# Patient Record
Sex: MALE | Race: WHITE | Employment: FULL TIME | URBAN - METROPOLITAN AREA
[De-identification: names, ages, dates, MRNs, and addresses within clinical notes are randomized per-mention and may not be internally consistent; named-entity substitution may affect disease eponyms.]

---

## 2022-09-24 ENCOUNTER — APPOINTMENT (OUTPATIENT)
Dept: GENERAL RADIOLOGY | Age: 52
DRG: 637 | End: 2022-09-24
Payer: COMMERCIAL

## 2022-09-24 ENCOUNTER — HOSPITAL ENCOUNTER (INPATIENT)
Age: 52
LOS: 1 days | Discharge: HOME | DRG: 637 | End: 2022-09-28
Attending: EMERGENCY MEDICINE | Admitting: INTERNAL MEDICINE
Payer: COMMERCIAL

## 2022-09-24 DIAGNOSIS — E11.9 NEW ONSET TYPE 2 DIABETES MELLITUS (HCC): Primary | ICD-10-CM

## 2022-09-24 PROBLEM — R73.9 ACUTE HYPERGLYCEMIA: Status: ACTIVE | Noted: 2022-09-24

## 2022-09-24 LAB
A/G RATIO: 1.8 (ref 1.1–2.2)
ALBUMIN SERPL-MCNC: 5.3 G/DL (ref 3.4–5)
ALP BLD-CCNC: 205 U/L (ref 40–129)
ALT SERPL-CCNC: 24 U/L (ref 10–40)
ANION GAP SERPL CALCULATED.3IONS-SCNC: 14 MMOL/L (ref 3–16)
AST SERPL-CCNC: 15 U/L (ref 15–37)
BASE EXCESS VENOUS: 1.7 MMOL/L (ref -3–3)
BASOPHILS ABSOLUTE: 0.1 K/UL (ref 0–0.2)
BASOPHILS RELATIVE PERCENT: 0.8 %
BILIRUB SERPL-MCNC: 0.4 MG/DL (ref 0–1)
BILIRUBIN URINE: NEGATIVE
BLOOD, URINE: NEGATIVE
BUN BLDV-MCNC: 12 MG/DL (ref 7–20)
CALCIUM SERPL-MCNC: 10.5 MG/DL (ref 8.3–10.6)
CARBOXYHEMOGLOBIN: 3 % (ref 0–1.5)
CHLORIDE BLD-SCNC: 91 MMOL/L (ref 99–110)
CLARITY: CLEAR
CO2: 24 MMOL/L (ref 21–32)
COLOR: YELLOW
CREAT SERPL-MCNC: 1.1 MG/DL (ref 0.9–1.3)
EOSINOPHILS ABSOLUTE: 0.1 K/UL (ref 0–0.6)
EOSINOPHILS RELATIVE PERCENT: 1.3 %
ETHANOL: NORMAL MG/DL (ref 0–0.08)
GFR AFRICAN AMERICAN: >60
GFR NON-AFRICAN AMERICAN: >60
GLUCOSE BLD-MCNC: 213 MG/DL (ref 70–99)
GLUCOSE BLD-MCNC: 252 MG/DL (ref 70–99)
GLUCOSE BLD-MCNC: 388 MG/DL
GLUCOSE BLD-MCNC: 388 MG/DL (ref 70–99)
GLUCOSE BLD-MCNC: 523 MG/DL (ref 70–99)
GLUCOSE BLD-MCNC: 574 MG/DL (ref 70–99)
GLUCOSE URINE: >=1000 MG/DL
HCO3 VENOUS: 25.7 MMOL/L (ref 23–29)
HCT VFR BLD CALC: 47.6 % (ref 40.5–52.5)
HEMOGLOBIN: 16 G/DL (ref 13.5–17.5)
KETONES, URINE: NEGATIVE MG/DL
LEUKOCYTE ESTERASE, URINE: NEGATIVE
LIPASE: 68 U/L (ref 13–60)
LYMPHOCYTES ABSOLUTE: 1.3 K/UL (ref 1–5.1)
LYMPHOCYTES RELATIVE PERCENT: 18.9 %
MAGNESIUM: 2.2 MG/DL (ref 1.8–2.4)
MCH RBC QN AUTO: 30.5 PG (ref 26–34)
MCHC RBC AUTO-ENTMCNC: 33.5 G/DL (ref 31–36)
MCV RBC AUTO: 91 FL (ref 80–100)
METHEMOGLOBIN VENOUS: 0.3 %
MICROSCOPIC EXAMINATION: ABNORMAL
MONOCYTES ABSOLUTE: 0.4 K/UL (ref 0–1.3)
MONOCYTES RELATIVE PERCENT: 5.9 %
NEUTROPHILS ABSOLUTE: 5.1 K/UL (ref 1.7–7.7)
NEUTROPHILS RELATIVE PERCENT: 73.1 %
NITRITE, URINE: NEGATIVE
O2 SAT, VEN: 78 %
O2 THERAPY: ABNORMAL
PCO2, VEN: 38.3 MMHG (ref 40–50)
PDW BLD-RTO: 12.7 % (ref 12.4–15.4)
PERFORMED ON: ABNORMAL
PH UA: 6 (ref 5–8)
PH VENOUS: 7.44 (ref 7.35–7.45)
PLATELET # BLD: 339 K/UL (ref 135–450)
PMV BLD AUTO: 8.1 FL (ref 5–10.5)
PO2, VEN: 38.5 MMHG (ref 25–40)
POTASSIUM SERPL-SCNC: 4.2 MMOL/L (ref 3.5–5.1)
PROTEIN UA: NEGATIVE MG/DL
RBC # BLD: 5.23 M/UL (ref 4.2–5.9)
SODIUM BLD-SCNC: 129 MMOL/L (ref 136–145)
SPECIFIC GRAVITY UA: <=1.005 (ref 1–1.03)
SPECIMEN STATUS: NORMAL
TCO2 CALC VENOUS: 27 MMOL/L
TOTAL PROTEIN: 8.2 G/DL (ref 6.4–8.2)
URINE REFLEX TO CULTURE: ABNORMAL
URINE TYPE: ABNORMAL
UROBILINOGEN, URINE: 0.2 E.U./DL
WBC # BLD: 7 K/UL (ref 4–11)

## 2022-09-24 PROCEDURE — 96360 HYDRATION IV INFUSION INIT: CPT

## 2022-09-24 PROCEDURE — 2580000003 HC RX 258: Performed by: EMERGENCY MEDICINE

## 2022-09-24 PROCEDURE — G0378 HOSPITAL OBSERVATION PER HR: HCPCS

## 2022-09-24 PROCEDURE — 80053 COMPREHEN METABOLIC PANEL: CPT

## 2022-09-24 PROCEDURE — 99285 EMERGENCY DEPT VISIT HI MDM: CPT

## 2022-09-24 PROCEDURE — 81003 URINALYSIS AUTO W/O SCOPE: CPT

## 2022-09-24 PROCEDURE — 85025 COMPLETE CBC W/AUTO DIFF WBC: CPT

## 2022-09-24 PROCEDURE — 6370000000 HC RX 637 (ALT 250 FOR IP): Performed by: HOSPITALIST

## 2022-09-24 PROCEDURE — 83735 ASSAY OF MAGNESIUM: CPT

## 2022-09-24 PROCEDURE — 82803 BLOOD GASES ANY COMBINATION: CPT

## 2022-09-24 PROCEDURE — 6370000000 HC RX 637 (ALT 250 FOR IP): Performed by: EMERGENCY MEDICINE

## 2022-09-24 PROCEDURE — 96374 THER/PROPH/DIAG INJ IV PUSH: CPT

## 2022-09-24 PROCEDURE — 2580000003 HC RX 258: Performed by: HOSPITALIST

## 2022-09-24 PROCEDURE — 96361 HYDRATE IV INFUSION ADD-ON: CPT

## 2022-09-24 PROCEDURE — 71045 X-RAY EXAM CHEST 1 VIEW: CPT

## 2022-09-24 PROCEDURE — 82077 ASSAY SPEC XCP UR&BREATH IA: CPT

## 2022-09-24 PROCEDURE — 93005 ELECTROCARDIOGRAM TRACING: CPT | Performed by: EMERGENCY MEDICINE

## 2022-09-24 PROCEDURE — 83690 ASSAY OF LIPASE: CPT

## 2022-09-24 RX ORDER — ENOXAPARIN SODIUM 100 MG/ML
40 INJECTION SUBCUTANEOUS DAILY
Status: DISCONTINUED | OUTPATIENT
Start: 2022-09-25 | End: 2022-09-28 | Stop reason: HOSPADM

## 2022-09-24 RX ORDER — POTASSIUM CHLORIDE 20 MEQ/1
40 TABLET, EXTENDED RELEASE ORAL PRN
Status: DISCONTINUED | OUTPATIENT
Start: 2022-09-24 | End: 2022-09-28 | Stop reason: HOSPADM

## 2022-09-24 RX ORDER — INSULIN LISPRO 100 [IU]/ML
0-8 INJECTION, SOLUTION INTRAVENOUS; SUBCUTANEOUS
Status: DISCONTINUED | OUTPATIENT
Start: 2022-09-24 | End: 2022-09-28 | Stop reason: HOSPADM

## 2022-09-24 RX ORDER — INSULIN GLARGINE 100 [IU]/ML
15 INJECTION, SOLUTION SUBCUTANEOUS NIGHTLY
Status: DISCONTINUED | OUTPATIENT
Start: 2022-09-24 | End: 2022-09-28 | Stop reason: HOSPADM

## 2022-09-24 RX ORDER — ACETAMINOPHEN 650 MG/1
650 SUPPOSITORY RECTAL EVERY 6 HOURS PRN
Status: DISCONTINUED | OUTPATIENT
Start: 2022-09-24 | End: 2022-09-28 | Stop reason: HOSPADM

## 2022-09-24 RX ORDER — DEXTROSE MONOHYDRATE 100 MG/ML
INJECTION, SOLUTION INTRAVENOUS CONTINUOUS PRN
Status: DISCONTINUED | OUTPATIENT
Start: 2022-09-24 | End: 2022-09-28 | Stop reason: HOSPADM

## 2022-09-24 RX ORDER — SODIUM CHLORIDE 9 MG/ML
INJECTION, SOLUTION INTRAVENOUS PRN
Status: DISCONTINUED | OUTPATIENT
Start: 2022-09-24 | End: 2022-09-28 | Stop reason: HOSPADM

## 2022-09-24 RX ORDER — SODIUM CHLORIDE 9 MG/ML
INJECTION, SOLUTION INTRAVENOUS CONTINUOUS
Status: ACTIVE | OUTPATIENT
Start: 2022-09-24 | End: 2022-09-25

## 2022-09-24 RX ORDER — 0.9 % SODIUM CHLORIDE 0.9 %
1000 INTRAVENOUS SOLUTION INTRAVENOUS ONCE
Status: COMPLETED | OUTPATIENT
Start: 2022-09-24 | End: 2022-09-24

## 2022-09-24 RX ORDER — ACETAMINOPHEN 325 MG/1
650 TABLET ORAL EVERY 6 HOURS PRN
Status: DISCONTINUED | OUTPATIENT
Start: 2022-09-24 | End: 2022-09-28 | Stop reason: HOSPADM

## 2022-09-24 RX ORDER — ASPIRIN 81 MG/1
81 TABLET ORAL DAILY
Status: DISCONTINUED | OUTPATIENT
Start: 2022-09-24 | End: 2022-09-28 | Stop reason: HOSPADM

## 2022-09-24 RX ORDER — SENNA PLUS 8.6 MG/1
1 TABLET ORAL DAILY PRN
Status: DISCONTINUED | OUTPATIENT
Start: 2022-09-24 | End: 2022-09-28 | Stop reason: HOSPADM

## 2022-09-24 RX ORDER — SODIUM CHLORIDE 0.9 % (FLUSH) 0.9 %
10 SYRINGE (ML) INJECTION PRN
Status: DISCONTINUED | OUTPATIENT
Start: 2022-09-24 | End: 2022-09-28 | Stop reason: HOSPADM

## 2022-09-24 RX ORDER — MAGNESIUM SULFATE IN WATER 40 MG/ML
2000 INJECTION, SOLUTION INTRAVENOUS PRN
Status: DISCONTINUED | OUTPATIENT
Start: 2022-09-24 | End: 2022-09-28 | Stop reason: HOSPADM

## 2022-09-24 RX ORDER — PROMETHAZINE HYDROCHLORIDE 25 MG/1
12.5 TABLET ORAL EVERY 6 HOURS PRN
Status: DISCONTINUED | OUTPATIENT
Start: 2022-09-24 | End: 2022-09-28 | Stop reason: HOSPADM

## 2022-09-24 RX ORDER — LABETALOL HYDROCHLORIDE 5 MG/ML
5 INJECTION, SOLUTION INTRAVENOUS EVERY 4 HOURS PRN
Status: DISCONTINUED | OUTPATIENT
Start: 2022-09-24 | End: 2022-09-28 | Stop reason: HOSPADM

## 2022-09-24 RX ORDER — INSULIN LISPRO 100 [IU]/ML
0.05 INJECTION, SOLUTION INTRAVENOUS; SUBCUTANEOUS
Status: DISCONTINUED | OUTPATIENT
Start: 2022-09-24 | End: 2022-09-28 | Stop reason: HOSPADM

## 2022-09-24 RX ORDER — SODIUM CHLORIDE 0.9 % (FLUSH) 0.9 %
10 SYRINGE (ML) INJECTION EVERY 12 HOURS SCHEDULED
Status: DISCONTINUED | OUTPATIENT
Start: 2022-09-24 | End: 2022-09-28 | Stop reason: HOSPADM

## 2022-09-24 RX ORDER — INSULIN LISPRO 100 [IU]/ML
0-4 INJECTION, SOLUTION INTRAVENOUS; SUBCUTANEOUS NIGHTLY
Status: DISCONTINUED | OUTPATIENT
Start: 2022-09-24 | End: 2022-09-28 | Stop reason: HOSPADM

## 2022-09-24 RX ORDER — ONDANSETRON 2 MG/ML
4 INJECTION INTRAMUSCULAR; INTRAVENOUS EVERY 6 HOURS PRN
Status: DISCONTINUED | OUTPATIENT
Start: 2022-09-24 | End: 2022-09-28 | Stop reason: HOSPADM

## 2022-09-24 RX ORDER — POTASSIUM CHLORIDE 7.45 MG/ML
10 INJECTION INTRAVENOUS PRN
Status: DISCONTINUED | OUTPATIENT
Start: 2022-09-24 | End: 2022-09-28 | Stop reason: HOSPADM

## 2022-09-24 RX ORDER — LISINOPRIL 10 MG/1
5 TABLET ORAL DAILY
Status: DISCONTINUED | OUTPATIENT
Start: 2022-09-24 | End: 2022-09-28 | Stop reason: HOSPADM

## 2022-09-24 RX ADMIN — SODIUM CHLORIDE 1000 ML: 9 INJECTION, SOLUTION INTRAVENOUS at 16:03

## 2022-09-24 RX ADMIN — SODIUM CHLORIDE, PRESERVATIVE FREE 10 ML: 5 INJECTION INTRAVENOUS at 22:01

## 2022-09-24 RX ADMIN — INSULIN GLARGINE 15 UNITS: 100 INJECTION, SOLUTION SUBCUTANEOUS at 21:58

## 2022-09-24 RX ADMIN — INSULIN HUMAN 4 UNITS: 100 INJECTION, SOLUTION PARENTERAL at 18:18

## 2022-09-24 RX ADMIN — SODIUM CHLORIDE 1000 ML: 9 INJECTION, SOLUTION INTRAVENOUS at 16:24

## 2022-09-24 ASSESSMENT — PAIN - FUNCTIONAL ASSESSMENT: PAIN_FUNCTIONAL_ASSESSMENT: NONE - DENIES PAIN

## 2022-09-24 NOTE — LETTER
[address]  [address] [facility] [address]  Phone: [phone]             September 28, 2022    Patient: Melia Rojas   YOB: 1970   Date of Visit: 9/24/2022       To Whom It May Concern:    Mary Zuñiga was seen and treated in our facility  beginning 9/24/2022 until 9/28/22. He may return to work on 9/30.      Sincerely,       Tayler Finney RN         Signature:__________________________________

## 2022-09-24 NOTE — H&P
HOSPITALISTS HISTORY AND PHYSICAL    9/25/2022 5:41 AM    Patient Information:  Kain Grimes is a 46 y.o. male [id number]  PCP:  Megan Sheth MD (Tel: [phone] )    Chief complaint:    Chief Complaint   Patient presents with    Hyperglycemia     Pt to ED with c/o hyperglycemia, glucose in 400s at home. Pt not diagnosed diabetic but used dads meter and sugar read in 400s. Pt reports increased urination, feeling thirsty, generalized weakness pt reports for years. History of Present Illness:  Yoko Mcnamara is a 46 y.o. male who presented to the ED to be evaluated for concerns of new onset diabetes. He reports that for the past several years he has experienced polyuria, polydipsia, polyphagia, and generalized weakness. His father, who is diabetic, allowed him to use his own glucometer; results revealed multiple glucose readings greater than 400. Upon further questioning patient also endorses blurred vision and symptoms of BLE neuropathy. He reports that his only known medical diagnoses prior to today include WPW and GERD. Upon arrival to the ED EKG was obtained revealing NSR with fusion complexes and PACs; new LBBB identified. CXR negative for acute cardiopulmonary findings. Notable labs include: Pseudohyponatremia 129, hyperglycemia 574, and lipase 68. Patient received NS bolus per ED attending as well as 4 units of IV Humulin prior to request for admission. History obtained from patient and review of [facility] chart    Old medical records show echo performed several years ago in 2010 was notable for the following abnormalities:  INTERPRETATION-  Concentric left ventricular hypertrophy with normal   left ventricular contractility and estimated ejection fraction of   55%. Right ventricular enlargement with normal contractility. Normal sized right atrium and aortic root.   Left atrium is slightly   enlarged. Normal mitral, tricuspid, aortic, and pulmonic valves. No intracardiac mass, vegetations, or thrombi. Intraatrial septum   is intact. No pericardial effusion. Inferior vena cava is not   visualized. By Doppler examination there is no valvular stenosis. A trace   mitral and a trace tricuspid regurgitation are present. There is   diastolic dysfunction of left ventricle. CONCLUSION-  Echocardiogram with a Doppler shows left ventricular   hypertrophy with normal ejection fraction. There is diastolic   dysfunction of left ventricle. No valvular heart disease is seen. There is no pericardial effusion. REVIEW OF SYSTEMS:   Constitutional: Negative for fever,chills; positive generalized weakness  ENT: Negative for headache, rhinorrhea, and sore throat. Respiratory: Negative for shortness of breath, wheezing, and cough  Cardiovascular: Negative for chest pain, palpitations, peripheral edema, orthopnea or PND  Gastrointestinal: Negative for N/V/D and abdominal pain; no hematemesis, hematochezia, or melena; no anorexia  Genitourinary: Positive for dysuria, frequency; denies retention; no incontinence  Hematologic/Lymphatic: Negative for bleeding tendency/excessive bruising  Musculoskeletal: Negative for myalgias and arthalgias; able to ambulate without difficulty  Neurologic: Positive BLE paresthesias; negative for LOC, seizure activity, dysarthria, vertigo, and gait disturbance  Skin: Negative for itching,rash, decubitus  Psychiatric: Negative for depression,anxiety, and agitation; no hallucinations; denies SI/HI  Endocrine: Positive for polyuria/polydipsia/polyphagia; strong family history of type II DM    Past Medical History:   has a past medical history of GERD (gastroesophageal reflux disease) and WPW (Dorothy-Parkinson-White syndrome). Past Surgical History:   has a past surgical history that includes hernia repair.      Medications:  No current facility-administered medications on file prior to encounter. Current Outpatient Medications on File Prior to Encounter   Medication Sig Dispense Refill    Cholecalciferol (VITAMIN D3) 5000 UNITS TABS Take by mouth (Patient not taking: Reported on 9/24/2022)      ibuprofen (ADVIL;MOTRIN) 800 MG tablet Take 1 tablet by mouth every 8 hours as needed for Pain or Fever 20 tablet 0    lidocaine (LIDODERM) 5 % Place 1 patch onto the skin daily 12 hours on, 12 hours off. 30 patch 0    Ascorbic Acid (VITAMIN C) 500 MG tablet Take 500 mg by mouth daily. (Patient not taking: Reported on 9/24/2022)      vitamin E 1000 UNIT capsule Take 1,000 Units by mouth daily. (Patient not taking: Reported on 9/24/2022)      [address] by Does not apply route. (Patient not taking: Reported on 9/24/2022)         Allergies: Allergies   Allergen Reactions    Penicillins Hives        Social History:   reports that he has never smoked. He has never used smokeless tobacco. He reports that he does not drink alcohol and does not use drugs.      Family History:  Type II DM in father    Physical Exam:  BP (!) 147/74   Pulse 57   Temp 98.1 °F (36.7 °C) (Oral)   Resp 18   Ht 6' 1\" (1.854 m)   Wt 157 lb (71.2 kg)   SpO2 100%   BMI 20.71 kg/m²     General appearance: WDWN male resting comfortably in bed, NAD  Eyes: Sclera clear without conjunctival injection; PERRLA; EOMI  ENT: Mucous membranes moist without thrush; normal dentition  Neck: Supple without meningismus; no goiter; no carotid bruit bilaterally  Cardiovascular: Regular rhythm with occasional ectopy; normal S1-S2 with no murmurs; no peripheral edema; no JVD  Respiratory: No tachypnea; CTAB with adequate air exchange, no wheeze, rhonchi or rales; I:E intact  Gastrointestinal: Abdomen soft, non-tender, not distended; bowel sounds normal; no masses/organomegaly appreciated  Musculoskeletal: FROM spine and extremities x4; no gross deformity  Neurology: A&O x3; cranial nerves 2-12 grossly intact; motor 5/5  BUE/BLE; no seizure activity  Psychiatry: Well-groomed with good eye contact; anxious affect; no visual/auditory hallucination  Skin: BLE pretibial regions with scarring and excoriations  PV: 2/4 radial and dorsalis pedis bilaterally; brisk capillary refill    Labs:  CBC:   Lab Results   Component Value Date/Time    WBC 7.1 09/25/2022 04:24 AM    RBC 4.40 09/25/2022 04:24 AM    HGB 13.6 09/25/2022 04:24 AM    HCT 39.9 09/25/2022 04:24 AM    MCV 90.5 09/25/2022 04:24 AM    MCH 30.9 09/25/2022 04:24 AM    MCHC 34.2 09/25/2022 04:24 AM    RDW 12.8 09/25/2022 04:24 AM     09/25/2022 04:24 AM    MPV 7.6 09/25/2022 04:24 AM     BMP:    Lab Results   Component Value Date/Time     09/25/2022 04:24 AM    K 3.4 09/25/2022 04:24 AM     09/25/2022 04:24 AM    CO2 25 09/25/2022 04:24 AM    BUN 11 09/25/2022 04:24 AM    CREATININE 0.7 09/25/2022 04:24 AM    CALCIUM 9.0 09/25/2022 04:24 AM    GFRAA >60 09/25/2022 04:24 AM    GFRAA >60 04/26/2011 07:00 PM    LABGLOM >60 09/25/2022 04:24 AM    GLUCOSE 292 09/25/2022 04:24 AM     XR CHEST PORTABLE   Final Result   Stable chronic changes with no acute abnormality seen. EKG: Ventricular Rate 87 P BPM QTc Calculation (Bazett) 486 P ms   Atrial Rate 87 P BPM P Axis 40 P degrees   P-R Interval 188 P ms R Axis -1 P degrees   QRS Duration 146 P ms T Axis 74 P degrees   Q-T Interval 404 P ms Diagnosis Sinus rhythm with Fusion complexes and Premature atrial complexes with Aberrant conduction; new LBBB        I visualized CXR images and EKG strips personally and agree with documented interpretation    Discussed case  with ED provider    Problem List:  Principal Problem:    New onset type 2 diabetes mellitus ([facility] [address]  Active Problems:    Acute hyperglycemia    New onset left bundle branch block (LBBB)    Elevated BP without diagnosis of hypertension    Peripheral neuropathy  Resolved Problems:    * No resolved hospital problems.  *        Consults:  IP CONSULT TO HOSPITALIST  IP CONSULT TO CARDIOLOGY  IP CONSULT TO DIABETES EDUCATOR  IP CONSULT TO DIETITIAN      Assessment/Plan:     Newly diagnosed type II DM with acute hyperglycemia  -A1c ordered with results pending at time of dictation  -Patient on no hypoglycemic agents PTA  - Initiate weight-based Lantus insulin 15 units nightly  -Humalog scheduled AC/HS in addition to PRN SSI coverage  -Carbohydrate restriction placed on diet  -Consultation placed to Nutrition for ADA dietary education     BP elevation with new LBBB  -Patient admitted to telemetry floor for continuous monitoring during stay  -EKG obtained in ED reviewed personally and notable for new LBBB  -Recommend initiation of oral lisinopril in the setting of newly diagnosed DM  -IV labetalol scheduled PRN (with parameters) to address extreme BP elevation  -Serial cardiac enzymes will be trended overnight; BNP pending  -ECHO scheduled in a.m. to further assess cardiac structure and function       DVT prophylaxis-Lovenox 40 mg subcu daily  Code status-full code  Diet-cardiac with carb restriction  IV access-PIV established in ED      Admit as observation. I anticipate hospitalization spanning less than two midnights for investigation and treatment of the above medically necessary diagnoses. Comment: Please note this report has been produced using speech recognition software and may contain errors related to that system including errors in grammar, punctuation, and spelling, as well as words and phrases that may be inappropriate. If there are any questions or concerns please feel free to contact the dictating provider for clarification.          Camelia Wells MD    9/25/2022 5:41 AM

## 2022-09-24 NOTE — LETTER
[address] [address] [address]  Phone: [phone]             September 28, 2022    Patient: Jo Sadler   YOB: 1970   Date of Visit: 9/24/2022       To Whom It May Concern:    Carolann Goodwin was seen and treated in our facility  beginning 9/24/2022 until {DISCHARGE ORES:[id number]}. He may return to work on 9/30 with a 5 pound lifting restriction until October, 4th .       Sincerely,       Geraldine Lama RN         Signature:__________________________________

## 2022-09-25 PROBLEM — E87.1 HYPONATREMIA: Status: ACTIVE | Noted: 2022-09-25

## 2022-09-25 PROBLEM — I44.7 NEW ONSET LEFT BUNDLE BRANCH BLOCK (LBBB): Status: ACTIVE | Noted: 2022-09-25

## 2022-09-25 PROBLEM — G62.9 PERIPHERAL NEUROPATHY: Status: ACTIVE | Noted: 2022-09-25

## 2022-09-25 PROBLEM — I10 BENIGN ESSENTIAL HTN: Status: ACTIVE | Noted: 2022-09-25

## 2022-09-25 PROBLEM — R03.0 ELEVATED BP WITHOUT DIAGNOSIS OF HYPERTENSION: Status: ACTIVE | Noted: 2022-09-25

## 2022-09-25 PROBLEM — E87.6 HYPOKALEMIA: Status: ACTIVE | Noted: 2022-09-25

## 2022-09-25 LAB
A/G RATIO: 1.5 (ref 1.1–2.2)
ALBUMIN SERPL-MCNC: 3.8 G/DL (ref 3.4–5)
ALBUMIN SERPL-MCNC: 4.3 G/DL (ref 3.4–5)
ALP BLD-CCNC: 135 U/L (ref 40–129)
ALT SERPL-CCNC: 17 U/L (ref 10–40)
AMPHETAMINE SCREEN, URINE: NORMAL
ANION GAP SERPL CALCULATED.3IONS-SCNC: 8 MMOL/L (ref 3–16)
ANION GAP SERPL CALCULATED.3IONS-SCNC: 9 MMOL/L (ref 3–16)
AST SERPL-CCNC: 14 U/L (ref 15–37)
BARBITURATE SCREEN URINE: NORMAL
BASOPHILS ABSOLUTE: 0.1 K/UL (ref 0–0.2)
BASOPHILS RELATIVE PERCENT: 1.1 %
BENZODIAZEPINE SCREEN, URINE: NORMAL
BILIRUB SERPL-MCNC: <0.2 MG/DL (ref 0–1)
BUN BLDV-MCNC: 10 MG/DL (ref 7–20)
BUN BLDV-MCNC: 11 MG/DL (ref 7–20)
CALCIUM SERPL-MCNC: 9 MG/DL (ref 8.3–10.6)
CALCIUM SERPL-MCNC: 9.1 MG/DL (ref 8.3–10.6)
CANNABINOID SCREEN URINE: NORMAL
CHLORIDE BLD-SCNC: 101 MMOL/L (ref 99–110)
CHLORIDE BLD-SCNC: 101 MMOL/L (ref 99–110)
CO2: 24 MMOL/L (ref 21–32)
CO2: 25 MMOL/L (ref 21–32)
COCAINE METABOLITE SCREEN URINE: NORMAL
CREAT SERPL-MCNC: 0.7 MG/DL (ref 0.9–1.3)
CREAT SERPL-MCNC: 0.7 MG/DL (ref 0.9–1.3)
EKG ATRIAL RATE: 75 BPM
EKG ATRIAL RATE: 87 BPM
EKG DIAGNOSIS: NORMAL
EKG DIAGNOSIS: NORMAL
EKG P AXIS: 40 DEGREES
EKG P AXIS: 64 DEGREES
EKG P-R INTERVAL: 188 MS
EKG P-R INTERVAL: 196 MS
EKG Q-T INTERVAL: 404 MS
EKG Q-T INTERVAL: 464 MS
EKG QRS DURATION: 146 MS
EKG QRS DURATION: 146 MS
EKG QTC CALCULATION (BAZETT): 486 MS
EKG QTC CALCULATION (BAZETT): 518 MS
EKG R AXIS: -1 DEGREES
EKG R AXIS: 36 DEGREES
EKG T AXIS: -40 DEGREES
EKG T AXIS: 74 DEGREES
EKG VENTRICULAR RATE: 75 BPM
EKG VENTRICULAR RATE: 87 BPM
EOSINOPHILS ABSOLUTE: 0.3 K/UL (ref 0–0.6)
EOSINOPHILS RELATIVE PERCENT: 3.8 %
ESTIMATED AVERAGE GLUCOSE: 343.6 MG/DL
FENTANYL SCREEN, URINE: NORMAL
GFR AFRICAN AMERICAN: >60
GFR AFRICAN AMERICAN: >60
GFR NON-AFRICAN AMERICAN: >60
GFR NON-AFRICAN AMERICAN: >60
GLUCOSE BLD-MCNC: 113 MG/DL (ref 70–99)
GLUCOSE BLD-MCNC: 188 MG/DL (ref 70–99)
GLUCOSE BLD-MCNC: 208 MG/DL (ref 70–99)
GLUCOSE BLD-MCNC: 221 MG/DL (ref 70–99)
GLUCOSE BLD-MCNC: 223 MG/DL (ref 70–99)
GLUCOSE BLD-MCNC: 257 MG/DL (ref 70–99)
GLUCOSE BLD-MCNC: 292 MG/DL (ref 70–99)
HBA1C MFR BLD: 13.6 %
HCT VFR BLD CALC: 39.9 % (ref 40.5–52.5)
HEMOGLOBIN: 13.6 G/DL (ref 13.5–17.5)
LACTIC ACID, SEPSIS: 0.8 MMOL/L (ref 0.4–1.9)
LACTIC ACID, SEPSIS: 0.9 MMOL/L (ref 0.4–1.9)
LYMPHOCYTES ABSOLUTE: 2.5 K/UL (ref 1–5.1)
LYMPHOCYTES RELATIVE PERCENT: 35.4 %
Lab: NORMAL
MAGNESIUM: 2 MG/DL (ref 1.8–2.4)
MCH RBC QN AUTO: 30.9 PG (ref 26–34)
MCHC RBC AUTO-ENTMCNC: 34.2 G/DL (ref 31–36)
MCV RBC AUTO: 90.5 FL (ref 80–100)
METHADONE SCREEN, URINE: NORMAL
MONOCYTES ABSOLUTE: 0.4 K/UL (ref 0–1.3)
MONOCYTES RELATIVE PERCENT: 6 %
NEUTROPHILS ABSOLUTE: 3.8 K/UL (ref 1.7–7.7)
NEUTROPHILS RELATIVE PERCENT: 53.7 %
OPIATE SCREEN URINE: NORMAL
OXYCODONE URINE: NORMAL
PDW BLD-RTO: 12.8 % (ref 12.4–15.4)
PERFORMED ON: ABNORMAL
PH UA: 7
PHENCYCLIDINE SCREEN URINE: NORMAL
PHOSPHORUS: 1.9 MG/DL (ref 2.5–4.9)
PHOSPHORUS: 2.4 MG/DL (ref 2.5–4.9)
PLATELET # BLD: 264 K/UL (ref 135–450)
PMV BLD AUTO: 7.6 FL (ref 5–10.5)
POTASSIUM REFLEX MAGNESIUM: 3.4 MMOL/L (ref 3.5–5.1)
POTASSIUM SERPL-SCNC: 3.7 MMOL/L (ref 3.5–5.1)
PROCALCITONIN: 0.09 NG/ML (ref 0–0.15)
RBC # BLD: 4.4 M/UL (ref 4.2–5.9)
SODIUM BLD-SCNC: 134 MMOL/L (ref 136–145)
SODIUM BLD-SCNC: 134 MMOL/L (ref 136–145)
TOTAL PROTEIN: 6.3 G/DL (ref 6.4–8.2)
WBC # BLD: 7.1 K/UL (ref 4–11)

## 2022-09-25 PROCEDURE — 85025 COMPLETE CBC W/AUTO DIFF WBC: CPT

## 2022-09-25 PROCEDURE — 96372 THER/PROPH/DIAG INJ SC/IM: CPT

## 2022-09-25 PROCEDURE — 83605 ASSAY OF LACTIC ACID: CPT

## 2022-09-25 PROCEDURE — 80307 DRUG TEST PRSMV CHEM ANLYZR: CPT

## 2022-09-25 PROCEDURE — G0378 HOSPITAL OBSERVATION PER HR: HCPCS

## 2022-09-25 PROCEDURE — 93005 ELECTROCARDIOGRAM TRACING: CPT | Performed by: HOSPITALIST

## 2022-09-25 PROCEDURE — 83036 HEMOGLOBIN GLYCOSYLATED A1C: CPT

## 2022-09-25 PROCEDURE — 84145 PROCALCITONIN (PCT): CPT

## 2022-09-25 PROCEDURE — 93010 ELECTROCARDIOGRAM REPORT: CPT | Performed by: INTERNAL MEDICINE

## 2022-09-25 PROCEDURE — 80053 COMPREHEN METABOLIC PANEL: CPT

## 2022-09-25 PROCEDURE — 84100 ASSAY OF PHOSPHORUS: CPT

## 2022-09-25 PROCEDURE — 83735 ASSAY OF MAGNESIUM: CPT

## 2022-09-25 PROCEDURE — 6370000000 HC RX 637 (ALT 250 FOR IP): Performed by: HOSPITALIST

## 2022-09-25 PROCEDURE — 2580000003 HC RX 258: Performed by: HOSPITALIST

## 2022-09-25 PROCEDURE — 99245 OFF/OP CONSLTJ NEW/EST HI 55: CPT | Performed by: INTERNAL MEDICINE

## 2022-09-25 PROCEDURE — 36415 COLL VENOUS BLD VENIPUNCTURE: CPT

## 2022-09-25 PROCEDURE — 6360000002 HC RX W HCPCS: Performed by: HOSPITALIST

## 2022-09-25 RX ADMIN — ACETAMINOPHEN 650 MG: 325 TABLET ORAL at 20:42

## 2022-09-25 RX ADMIN — ENOXAPARIN SODIUM 40 MG: 100 INJECTION SUBCUTANEOUS at 09:44

## 2022-09-25 RX ADMIN — INSULIN GLARGINE 15 UNITS: 100 INJECTION, SOLUTION SUBCUTANEOUS at 20:43

## 2022-09-25 RX ADMIN — SODIUM CHLORIDE: 9 INJECTION, SOLUTION INTRAVENOUS at 01:54

## 2022-09-25 RX ADMIN — LISINOPRIL 5 MG: 10 TABLET ORAL at 09:44

## 2022-09-25 RX ADMIN — SODIUM CHLORIDE, PRESERVATIVE FREE 10 ML: 5 INJECTION INTRAVENOUS at 20:42

## 2022-09-25 RX ADMIN — ASPIRIN 81 MG: 81 TABLET, COATED ORAL at 09:44

## 2022-09-25 RX ADMIN — INSULIN LISPRO 4 UNITS: 100 INJECTION, SOLUTION INTRAVENOUS; SUBCUTANEOUS at 09:50

## 2022-09-25 RX ADMIN — INSULIN LISPRO 2 UNITS: 100 INJECTION, SOLUTION INTRAVENOUS; SUBCUTANEOUS at 14:32

## 2022-09-25 RX ADMIN — INSULIN LISPRO 4 UNITS: 100 INJECTION, SOLUTION INTRAVENOUS; SUBCUTANEOUS at 14:32

## 2022-09-25 ASSESSMENT — PAIN SCALES - GENERAL
PAINLEVEL_OUTOF10: 8
PAINLEVEL_OUTOF10: 0

## 2022-09-25 NOTE — CONSULTS
Consult placed    Who: Dr. Vinicio Bañuelos AM    Electronically signed by Anant Negro on 9/25/2022 at 8:11 AM

## 2022-09-25 NOTE — PROGRESS NOTES
Pt arrived to unit via wheelchair from ER. Admission assessment complete. V/S WNL. Pt AOx4, ambulatory, pt states he works full time and is independent with ADL's. Educated pt on new dx of DMII, completed first dose education of lantus. I demonstrated the syringe and locations to inject insulin should he be discharged home with sq insulin. Pt stated understanding and importance of compliance. I explained to pt that the doctor would evaluate and prescribe the appropriate medication and evaluation time frame to monitor blood sugar and medication to treat based on blood sugar result. Call light within reach, bed in lowest position with wheels locked. Pt states understanding of use of call light if he should need assistance. Pt has no c/o needs or pain & no s/s distress noted.

## 2022-09-25 NOTE — PROGRESS NOTES
Hospitalist Progress Note      PCP: Reji Perez MD    Date of Admission: 9/24/2022    Chief Complaint: Hyperglycemia    Hospital Course: Damaris Kunz is a 46 y.o. male who presented to the ED to be evaluated for concerns of new onset diabetes. He reports that for the past several years he has experienced polyuria, polydipsia, polyphagia, and generalized weakness. His father, who is diabetic, allowed him to use his own glucometer; results revealed multiple glucose readings greater than 400. Upon further questioning patient also endorses blurred vision and symptoms of BLE neuropathy. He reports that his only known medical diagnoses prior to today include WPW and GERD. Upon arrival to the ED EKG was obtained revealing NSR with fusion complexes and PACs; new LBBB identified. CXR negative for acute cardiopulmonary findings. Notable labs include: Pseudohyponatremia 129, hyperglycemia 574, and lipase 68. Patient received NS bolus per ED attending as well as 4 units of IV Humulin prior to request for admission. Subjective: NAD, denies cp/sob. Updated on plan of care.        Medications:  Reviewed    Infusion Medications    dextrose      sodium chloride       Scheduled Medications    insulin glargine  15 Units SubCUTAneous Nightly    insulin lispro  0-8 Units SubCUTAneous TID WC    insulin lispro  0-4 Units SubCUTAneous Nightly    insulin lispro  0.05 Units/kg SubCUTAneous TID WC    sodium chloride flush  10 mL IntraVENous 2 times per day    enoxaparin  40 mg SubCUTAneous Daily    lisinopril  5 mg Oral Daily    aspirin  81 mg Oral Daily     PRN Meds: glucose, dextrose bolus **OR** dextrose bolus, glucagon (rDNA), dextrose, sodium chloride flush, sodium chloride, potassium chloride **OR** potassium alternative oral replacement **OR** potassium chloride, magnesium sulfate, promethazine **OR** ondansetron, senna, acetaminophen **OR** acetaminophen, perflutren lipid microspheres, labetalol      Intake/Output Summary (Last 24 hours) at 9/25/2022 0921  Last data filed at 9/25/2022 0508  Gross per 24 hour   Intake 1000 ml   Output 825 ml   Net 175 ml       Physical Exam Performed:    BP (!) 147/74   Pulse 57   Temp 98.1 °F (36.7 °C) (Oral)   Resp 18   Ht 6' 1\" (1.854 m)   Wt 157 lb (71.2 kg)   SpO2 100%   BMI 20.71 kg/m²     General appearance: No apparent distress, appears stated age and cooperative. HEENT: Pupils equal, round, and reactive to light. Conjunctivae/corneas clear. Neck: Supple, with full range of motion. No jugular venous distention. Trachea midline. Respiratory:  Normal respiratory effort. Clear to auscultation, bilaterally without Rales/Wheezes/Rhonchi. Cardiovascular: Regular rate and rhythm with normal S1/S2 without murmurs, rubs or gallops. Abdomen: Soft, non-tender, non-distended with normal bowel sounds. Musculoskeletal: No clubbing, cyanosis or edema bilaterally. Full range of motion without deformity. Skin: Skin color, texture, turgor normal.  No rashes or lesions. Neurologic:  Neurovascularly intact without any focal sensory/motor deficits. Cranial nerves: II-XII intact, grossly non-focal.  Psychiatric: Alert and oriented, thought content appropriate, normal insight  Capillary Refill: Brisk, 3 seconds, normal   Peripheral Pulses: +2 palpable, equal bilaterally       Labs:   Recent Labs     09/24/22  1553 09/25/22  0424   WBC 7.0 7.1   HGB 16.0 13.6   HCT 47.6 39.9*    264     Recent Labs     09/24/22  1553 09/25/22  0424   * 134*   K 4.2 3.4*   CL 91* 101   CO2 24 25   BUN 12 11   CREATININE 1.1 0.7*   CALCIUM 10.5 9.0   PHOS  --  2.4*     Recent Labs     09/24/22  1553 09/25/22  0424   AST 15 14*   ALT 24 17   BILITOT 0.4 <0.2   ALKPHOS 205* 135*     No results for input(s): INR in the last 72 hours. No results for input(s): Hamp Memory in the last 72 hours.     Urinalysis:      Lab Results   Component Value Date/Time    NITRU Negative 09/24/2022 04:08 PM    BLOODU Negative 09/24/2022 04:08 PM    SPECGRAV <=1.005 09/24/2022 04:08 PM    GLUCOSEU >=1000 09/24/2022 04:08 PM       Radiology:  XR CHEST PORTABLE   Final Result   Stable chronic changes with no acute abnormality seen. Assessment/Plan:    Active Hospital Problems    Diagnosis     New onset left bundle branch block (LBBB) [I44.7]      Priority: Medium    Elevated BP without diagnosis of hypertension [R03.0]      Priority: Medium    Peripheral neuropathy [G62.9]      Priority: Medium    New onset type 2 diabetes mellitus ([facility] [address] [E11.9]      Priority: Medium    Acute hyperglycemia [R73.9]      Priority: Medium     Newly diagnosed type II DM with acute hyperglycemia  -A1c ordered   -Patient on no hypoglycemic agents PTA  - Initiate weight-based Lantus insulin 15 units nightly  -Humalog scheduled AC/HS in addition to PRN SSI coverage  -Carbohydrate restriction placed on diet  -Consultation placed to Nutrition for ADA dietary education      BP elevation with new LBBB  -Patient admitted to telemetry floor for continuous monitoring during stay  -EKG obtained in ED reviewed personally and notable for new LBBB  -Cardiology consulted-recommend stress test in am  -Recommend initiation of oral lisinopril in the setting of newly diagnosed DM  -IV labetalol scheduled PRN (with parameters) to address extreme BP elevation  -Serial cardiac enzymes will be trended overnight; BNP pending  -ECHO scheduled in a.m. to further assess cardiac structure and function     DVT Prophylaxis: Lovenox  Diet: ADULT DIET; Regular; 4 carb choices (60 gm/meal); Low Fat/Low Chol/High Fiber/GM  Code Status: Full Code  PT/OT Eval Status: ambulatory    Dispo - pending stress test/echo.     Appropriate for A1 Discharge Unit: Estela Senior, APRN - CNP

## 2022-09-25 NOTE — PROGRESS NOTES
4 Eyes Skin Assessment     The patient is being assess for   Admission    I agree that 2 RN's have performed a thorough Head to Toe Skin Assessment on the patient. ALL assessment sites listed below have been assessed. Areas assessed for pressure by both nurses:   [x]   Head, Face, and Ears   [x]   Shoulders, Back, and Chest, Abdomen  [x]   Arms, Elbows, and Hands   [x]   Coccyx, Sacrum, and Ischium  [x]   Legs, Feet, and Heels        Skin Assessed Under all Medical Devices by both nurses:  N/a               All Mepilex Borders were peeled back and area peeked at by both nurses:  No: n/a  Please list where Mepilex Borders are located:  n/a             **SHARE this note so that the co-signing nurse is able to place an eSignature**    Co-signer eSignature: Electronically signed by Romario Harmon on 9/25/22 at 1:37 AM EDT    Does the Patient have Skin Breakdown related to pressure?    No, n/a             Lb Prevention initiated:  No   Wound Care Orders initiated:  No      [facility] nurse consulted for Pressure Injury (Stage 3,4, Unstageable, DTI, NWPT, Complex wounds)and New or Established Ostomies:  no, n/a    Primary Nurse eSignature: Electronically signed by Alvarado Hoskins RN on 9/24/22 at 11:31 PM EDT

## 2022-09-25 NOTE — PLAN OF CARE
Problem: Chronic Conditions and Co-morbidities  Goal: Knowledge of the need for serum glucose monitoring  Description: Knowledge of the need for serum glucose monitoring  Outcome: Progressing     Problem: Chronic Conditions and Co-morbidities  Goal: Patient's chronic conditions and co-morbidity symptoms are monitored and maintained or improved  Flowsheets (Taken 9/25/2022 0122)  Care Plan - Patient's Chronic Conditions and Co-Morbidity Symptoms are Monitored and Maintained or Improved:   Monitor and assess patient's chronic conditions and comorbid symptoms for stability, deterioration, or improvement   Collaborate with multidisciplinary team to address chronic and comorbid conditions and prevent exacerbation or deterioration   Update acute care plan with appropriate goals if chronic or comorbid symptoms are exacerbated and prevent overall improvement and discharge

## 2022-09-25 NOTE — ED PROVIDER NOTES
CHIEF COMPLAINT  Hyperglycemia (Pt to ED with c/o hyperglycemia, glucose in 400s at home. Pt not diagnosed diabetic but used dads meter and sugar read in 400s. Pt reports increased urination, feeling thirsty, generalized weakness pt reports for years.)      HISTORY OF PRESENT ILLNESS  Lis Coleman is a 46 y.o. male with a history of WPW, GERD who presents to the ED complaining of polyuria, polydipsia, dysuria, hyperglycemia. States he has been feeling unwell for several months. Reports usually his symptoms improve when he drinks a sugary soda however this has ceased to help. States he has not unquenchable thirst and urinates frequently. A relative let him use their glucometer and he found his blood sugar at home to be in the high 400s. States he has never been diagnosed with diabetes. Denies fever, chills, chest pain, cough, dyspnea, syncope, abdominal pain, nausea, vomiting. No other complaints, modifying factors or associated symptoms. I have reviewed the following from the nursing documentation. Past Medical History:   Diagnosis Date    GERD (gastroesophageal reflux disease)     WPW (Dorothy-Parkinson-White syndrome)      Past Surgical History:   Procedure Laterality Date    HERNIA REPAIR       History reviewed. No pertinent family history.   Social History     Socioeconomic History    Marital status: Single     Spouse name: Not on file    Number of children: Not on file    Years of education: Not on file    Highest education level: Not on file   Occupational History    Not on file   Tobacco Use    Smoking status: Never    Smokeless tobacco: Never   Substance and Sexual Activity    Alcohol use: No    Drug use: No    Sexual activity: Not on file   Other Topics Concern    Not on file   Social History Narrative    Not on file     Social Determinants of Health     Financial Resource Strain: Not on file   Food Insecurity: Not on file   Transportation Needs: Not on file   Physical Activity: Not on file Stress: Not on file   Social Connections: Not on file   Intimate Partner Violence: Not on file   Housing Stability: Not on file     Current Facility-Administered Medications   Medication Dose Route Frequency Provider Last Rate Last Admin    glucose chewable tablet 16 g  4 tablet Oral PRN Erik Hyatt MD        dextrose bolus 10% 125 mL  125 mL IntraVENous PRN Erik Hyatt MD        Or    dextrose bolus 10% 250 mL  250 mL IntraVENous PRN Erik Hyatt MD        glucagon (rDNA) injection 1 mg  1 mg SubCUTAneous PRN Erik Hyatt MD        dextrose 10 % infusion   IntraVENous Continuous PRN Erik Hyatt MD        insulin glargine (LANTUS) injection vial 15 Units  15 Units SubCUTAneous Nightly Erik Hyatt MD   15 Units at 09/24/22 2158    insulin lispro (HUMALOG) injection vial 0-8 Units  0-8 Units SubCUTAneous TID JG Hyatt MD        insulin lispro (HUMALOG) injection vial 0-4 Units  0-4 Units SubCUTAneous Nightly Erik Hyatt MD        insulin lispro (HUMALOG) injection vial 4 Units  0.05 Units/kg SubCUTAneous TID JG Hyatt MD        0.9 % sodium chloride infusion   IntraVENous Continuous Erik Hyatt MD        sodium chloride flush 0.9 % injection 10 mL  10 mL IntraVENous 2 times per day Erik Hyatt MD   10 mL at 09/24/22 2201    sodium chloride flush 0.9 % injection 10 mL  10 mL IntraVENous PRN Erik Hyatt MD        0.9 % sodium chloride infusion   IntraVENous PRN Erik Hyatt MD        potassium chloride (KLOR-CON M) extended release tablet 40 mEq  40 mEq Oral PRN Erik Hyatt MD        Or    potassium bicarb-citric acid (EFFER-K) effervescent tablet 40 mEq  40 mEq Oral PRN Erik Hyatt MD        Or    potassium chloride 10 mEq/100 mL IVPB (Peripheral Line)  10 mEq IntraVENous PRN Erik Hyatt MD        magnesium sulfate 2000 mg in 50 mL IVPB premix  2,000 mg IntraVENous PRN Erik Hyatt MD        enoxaparin (LOVENOX) injection 40 mg 40 mg SubCUTAneous Daily Nadine Milan MD        promethazine (PHENERGAN) tablet 12.5 mg  12.5 mg Oral Q6H PRN Nadine Milan MD        Or    ondansetron [facility]) injection 4 mg  4 mg IntraVENous Q6H PRN Nadine Milan MD        senna (SENOKOT) tablet 8.6 mg  1 tablet Oral Daily PRN Nadine Milan MD        acetaminophen (TYLENOL) tablet 650 mg  650 mg Oral Q6H PRN Nadine Milan MD        Or    acetaminophen (TYLENOL) suppository 650 mg  650 mg Rectal Q6H PRN Nadine Milan MD        perflutren lipid microspheres (DEFINITY) injection 1.65 mg  1.5 mL IntraVENous ONCE PRN Nadine Milan MD        lisinopril (PRINIVIL;ZESTRIL) tablet 5 mg  5 mg Oral Daily Nadine Milan MD        labetalol (NORMODYNE;TRANDATE) injection 5 mg  5 mg IntraVENous Q4H PRN Nadine Milan MD        aspirin EC tablet 81 mg  81 mg Oral Daily Nadine Milan MD         Allergies   Allergen Reactions    Penicillins Hives       REVIEW OF SYSTEMS  10 systems reviewed, pertinent positives per HPI otherwise noted to be negative. PHYSICAL EXAM  /86   Pulse 60   Temp 98.5 °F (36.9 °C) (Oral)   Resp 16   Ht 6' 1\" (1.854 m)   Wt 157 lb (71.2 kg)   SpO2 99%   BMI 20.71 kg/m²   GENERAL APPEARANCE: Awake and alert. Cooperative. No acute distress. HEAD: Normocephalic. Atraumatic. EYES: PERRL. EOM's grossly intact. ENT: Mucous membranes are dry. NECK: Supple, trachea midline. HEART: RR rate 106. Normal S1, S2. No murmurs, rubs or gallops. LUNGS: Respirations unlabored. CTAB. Good air exchange. No wheezes, rales, or rhonchi. Speaking comfortably in full sentences. ABDOMEN: Soft. Non-distended. Non-tender. No guarding or rebound. Normal Bowel sounds. EXTREMITIES: No peripheral edema. MAEE. No acute deformities. SKIN: Warm and dry. No acute rashes. NEUROLOGICAL: Alert and oriented X 3. CN II-XII intact. No gross facial drooping. Strength symmetrical.   PSYCHIATRIC: Normal mood and affect.     LABS  I have reviewed all labs for this visit.    Results for orders placed or performed during the hospital encounter of 09/24/22   CBC with Auto Differential   Result Value Ref Range    WBC 7.0 4.0 - 11.0 K/uL    RBC 5.23 4.20 - 5.90 M/uL    Hemoglobin 16.0 13.5 - 17.5 g/dL    Hematocrit 47.6 40.5 - 52.5 %    MCV 91.0 80.0 - 100.0 fL    MCH 30.5 26.0 - 34.0 pg    MCHC 33.5 31.0 - 36.0 g/dL    RDW 12.7 12.4 - 15.4 %    Platelets 794 357 - 449 K/uL    MPV 8.1 5.0 - 10.5 fL    Neutrophils % 73.1 %    Lymphocytes % 18.9 %    Monocytes % 5.9 %    Eosinophils % 1.3 %    Basophils % 0.8 %    Neutrophils Absolute 5.1 1.7 - 7.7 K/uL    Lymphocytes Absolute 1.3 1.0 - 5.1 K/uL    Monocytes Absolute 0.4 0.0 - 1.3 K/uL    Eosinophils Absolute 0.1 0.0 - 0.6 K/uL    Basophils Absolute 0.1 0.0 - 0.2 K/uL   Lipase   Result Value Ref Range    Lipase 68.0 (H) 13.0 - 60.0 U/L   Comprehensive Metabolic Panel   Result Value Ref Range    Sodium 129 (L) 136 - 145 mmol/L    Potassium 4.2 3.5 - 5.1 mmol/L    Chloride 91 (L) 99 - 110 mmol/L    CO2 24 21 - 32 mmol/L    Anion Gap 14 3 - 16    Glucose 574 (H) 70 - 99 mg/dL    BUN 12 7 - 20 mg/dL    Creatinine 1.1 0.9 - 1.3 mg/dL    GFR Non-African American >60 >60    GFR African American >60 >60    Calcium 10.5 8.3 - 10.6 mg/dL    Total Protein 8.2 6.4 - 8.2 g/dL    Albumin 5.3 (H) 3.4 - 5.0 g/dL    Albumin/Globulin Ratio 1.8 1.1 - 2.2    Total Bilirubin 0.4 0.0 - 1.0 mg/dL    Alkaline Phosphatase 205 (H) 40 - 129 U/L    ALT 24 10 - 40 U/L    AST 15 15 - 37 U/L   Magnesium   Result Value Ref Range    Magnesium 2.20 1.80 - 2.40 mg/dL   Urinalysis with Reflex to Culture    Specimen: Urine   Result Value Ref Range    Color, UA Yellow Straw/Yellow    Clarity, UA Clear Clear    Glucose, Ur >=1000 (A) Negative mg/dL    Bilirubin Urine Negative Negative    Ketones, Urine Negative Negative mg/dL    Specific Gravity, UA <=1.005 1.005 - 1.030    Blood, Urine Negative Negative    pH, UA 6.0 5.0 - 8.0    Protein, UA Negative Negative mg/dL    Urobilinogen, Urine 0.2 <2.0 E.U./dL    Nitrite, Urine Negative Negative    Leukocyte Esterase, Urine Negative Negative    Microscopic Examination Not Indicated     Urine Type NotGiven     Urine Reflex to Culture Not Indicated    Blood gas, venous   Result Value Ref Range    pH, Yo 7.444 7.350 - 7.450    pCO2, Yo 38.3 (L) 40.0 - 50.0 mmHg    pO2, Yo 38.5 25.0 - 40.0 mmHg    HCO3, Venous 25.7 23.0 - 29.0 mmol/L    Base Excess, Yo 1.7 -3.0 - 3.0 mmol/L    O2 Sat, Yo 78 Not Established %    Carboxyhemoglobin 3.0 (H) 0.0 - 1.5 %    MetHgb, Yo 0.3 <1.5 %    TC02 (Calc), Oy 27 Not Established mmol/L    O2 Therapy Unknown    Sample possible blood bank testing   Result Value Ref Range    Specimen Status CHRISTINE    Ethanol   Result Value Ref Range    Ethanol Lvl None Detected mg/dL   POCT glucose   Result Value Ref Range    Glucose 388 mg/dL   POCT Glucose   Result Value Ref Range    POC Glucose 523 (H) 70 - 99 mg/dl    Performed on ACCU-CHEK    POCT Glucose   Result Value Ref Range    POC Glucose 388 (H) 70 - 99 mg/dl    Performed on ACCU-CHEK    POCT Glucose   Result Value Ref Range    POC Glucose 252 (H) 70 - 99 mg/dl    Performed on ACCU-CHEK    POCT Glucose   Result Value Ref Range    POC Glucose 213 (H) 70 - 99 mg/dl    Performed on ACCU-CHEK    EKG 12 Lead   Result Value Ref Range    Ventricular Rate 87 BPM    Atrial Rate 87 BPM    P-R Interval 188 ms    QRS Duration 146 ms    Q-T Interval 404 ms    QTc Calculation (Bazett) 486 ms    P Axis 40 degrees    R Axis -1 degrees    T Axis 74 degrees    Diagnosis       Sinus rhythm with Fusion complexes and Premature atrial complexes with Aberrant conductionLeft bundle branch blockAbnormal ECGWhen compared with ECG of 26-APR-2011 19:25,Fusion complexes are now PresentAberrant conduction is now PresentLeft bundle branch block is now PresentMinimal criteria for Inferior infarct are no longer Present         EKG  NSR, rate 87, occasional PACs, left bundle branch block, QTC prolongation, nonspecific lateral T wave abnormalities. No priors available for comparison. RADIOLOGY  X-RAYS:  I have reviewed radiologic plain film image(s). ALL OTHER NON-PLAIN FILM IMAGES SUCH AS CT, ULTRASOUND AND MRI HAVE BEEN READ BY THE RADIOLOGIST. XR CHEST PORTABLE   Final Result   Stable chronic changes with no acute abnormality seen. Rechecks: Physical assessment performed. Resting comfortably    ED COURSE/MDM  Patient seen and evaluated. Old records reviewed. Labs and imaging reviewed and results discussed with patient. Patient here with polyuria, polydipsia, dysuria, fatigue, found to have an elevated blood sugar at home with no prior history of diabetes. Appears to have new onset type 2 diabetes with glucose 574. Not yet in DKA but certainly headed in that direction. Responding well to IV fluids and insulin. Admitted to the hospitalist service. Current Discharge Medication List          CLINICAL IMPRESSION  1. New onset type 2 diabetes mellitus (HCC)        Blood pressure 137/86, pulse 60, temperature 98.5 °F (36.9 °C), temperature source Oral, resp. rate 16, height 6' 1\" (1.854 m), weight 157 lb (71.2 kg), SpO2 99 %. [address] [place] [address] was admitted in stable condition.         Samantha Padilla MD  09/25/22 [id number]

## 2022-09-25 NOTE — CONSULTS
[address]   Cardiology Consultation   Date: 9/25/2022  Reason for Consultation: New left bundle branch block  Consult Requesting Physician: Leticia Bello MD     Chief Complaint   Patient presents with    Hyperglycemia     Pt to ED with c/o hyperglycemia, glucose in 400s at home. Pt not diagnosed diabetic but used dads meter and sugar read in 400s. Pt reports increased urination, feeling thirsty, generalized weakness pt reports for years. HPI: Charlene Ac is a 46 y.o. male with reported history of WPW which was shown on 1 EKG in the past in 2010. Came to the emergency room for the concerns of diabetes. He checked his blood sugar and it was greater than 400. He is EKG showed a new left bundle branch block. He was hyponatremic. He was also hypertensive. He denies any history of chest pain or dizziness or syncope. Past Medical History:   Diagnosis Date    GERD (gastroesophageal reflux disease)     WPW (Dorothy-Parkinson-White syndrome)         Past Surgical History:   Procedure Laterality Date    HERNIA REPAIR         Allergies   Allergen Reactions    Penicillins Hives       Social History:  Reviewed. reports that he has never smoked. He has never used smokeless tobacco. He reports that he does not drink alcohol and does not use drugs. Family History:  Reviewed. Family Hx was Reviewed. No relevant family history is present. Review of System:  All other systems reviewed and are negative except for that noted above.  Pertinent negatives are:     General: negative for fever, chills   Ophthalmic ROS: negative for - eye pain or loss of vision  ENT ROS: negative for - headaches, sore throat   Respiratory: negative for - cough, sputum  Cardiovascular: Reviewed in HPI  Gastrointestinal: negative for - abdominal pain, diarrhea, N/V  Hematology: negative for - bleeding, blood clots, bruising or jaundice  Genito-Urinary:  negative for - Dysuria or incontinence  Musculoskeletal: negative for - Joint swelling, muscle pain  Neurological: negative for - confusion, dizziness, headaches   Psychiatric: No anxiety, no depression. Dermatological: negative for - rash    Physical Examination:  Vitals:    22 0430   BP: (!) 147/74   Pulse: 57   Resp: 18   Temp: 98.1 °F (36.7 °C)   SpO2: 100%      In: 1000   Out: 825    Wt Readings from Last 3 Encounters:   22 157 lb (71.2 kg)   17 189 lb (85.7 kg)     Temp  Av.3 °F (36.8 °C)  Min: 98.1 °F (36.7 °C)  Max: 98.5 °F (36.9 °C)  Pulse  Av.3  Min: 57  Max: 106  BP  Min: 119/67  Max: 147/74  SpO2  Av.7 %  Min: 97 %  Max: 100 %    Intake/Output Summary (Last 24 hours) at 2022 0842  Last data filed at 2022 0508  Gross per 24 hour   Intake 1000 ml   Output 825 ml   Net 175 ml       Telemetry: Sinus rhythm   Constitutional: Oriented. No distress. Head: Normocephalic and atraumatic. Mouth/Throat: Oropharynx is clear and moist.   Eyes: Conjunctivae normal. EOM are normal.   Neck: Neck supple. No rigidity. No JVD present. Cardiovascular: Normal rate, regular rhythm, S1&S2. Pulmonary/Chest: Bilateral respiratory sounds. No wheezes, No rhonchi. Abdominal: Soft. Bowel sounds present. No distension, No tenderness. Musculoskeletal: No tenderness. No edema    Lymphadenopathy: Has no cervical adenopathy. Neurological: Alert and oriented. Cranial nerve appears intact, No Gross deficit   Skin: Skin is warm and dry. No rash noted. Healed ulcerations on both shins. Psychiatric: Has a normal behavior     Labs, diagnostic and imaging results reviewed. Reviewed.    Recent Labs     22  1553 22  0424   * 134*   K 4.2 3.4*   CL 91* 101   CO2 24 25   PHOS  --  2.4*   BUN 12 11   CREATININE 1.1 0.7*     Recent Labs     22  1553 22  0424   WBC 7.0 7.1   HGB 16.0 13.6   HCT 47.6 39.9*   MCV 91.0 90.5    264     Lab Results   Component Value Date/Time    CKMB 0.49 2010 07:40 AM    TROPONINI 0.008 04/26/2011 07:00 PM     No results found for: BNP  Lab Results   Component Value Date/Time    PROTIME 14.5 03/09/2010 07:40 AM    INR 1.27 03/09/2010 07:40 AM     Lab Results   Component Value Date/Time    CHOL 111 03/04/2010 08:41 AM    HDL 22 03/04/2010 08:41 AM    TRIG 120 03/04/2010 08:41 AM       ECG:    Sinus rhythm with Fusion complexes and Premature atrial complexes with Aberrant conductionLeft bundle branch block        Echo: 2010  INTERPRETATION-  Concentric left ventricular hypertrophy with normal   left ventricular contractility and estimated ejection fraction of   55%. Right ventricular enlargement with normal contractility. Normal sized right atrium and aortic root. Left atrium is slightly   enlarged. Normal mitral, tricuspid, aortic, and pulmonic valves. No intracardiac mass, vegetations, or thrombi. Intraatrial septum   is intact. No pericardial effusion. Inferior vena cava is not   visualized. By Doppler examination there is no valvular stenosis. A trace   mitral and a trace tricuspid regurgitation are present. There is   diastolic dysfunction of left ventricle. CONCLUSION-  Echocardiogram with a Doppler shows left ventricular   hypertrophy with normal ejection fraction. There is diastolic   dysfunction of left ventricle. No valvular heart disease is seen. There is no pericardial effusion.    Cath:     Scheduled Meds:   insulin glargine  15 Units SubCUTAneous Nightly    insulin lispro  0-8 Units SubCUTAneous TID WC    insulin lispro  0-4 Units SubCUTAneous Nightly    insulin lispro  0.05 Units/kg SubCUTAneous TID     sodium chloride flush  10 mL IntraVENous 2 times per day    enoxaparin  40 mg SubCUTAneous Daily    lisinopril  5 mg Oral Daily    aspirin  81 mg Oral Daily     Continuous Infusions:   dextrose      sodium chloride 100 mL/hr at 09/25/22 0154    sodium chloride       PRN Meds:.glucose, dextrose bolus **OR** dextrose bolus, glucagon (rDNA), dextrose, sodium chloride flush, sodium chloride, potassium chloride **OR** potassium alternative oral replacement **OR** potassium chloride, magnesium sulfate, promethazine **OR** ondansetron, senna, acetaminophen **OR** acetaminophen, perflutren lipid microspheres, labetalol     Patient Active Problem List    Diagnosis Date Noted    New onset left bundle branch block (LBBB) 09/25/2022    Elevated BP without diagnosis of hypertension 09/25/2022    Peripheral neuropathy 09/25/2022    New onset type 2 diabetes mellitus ([facility] 09/24/2022    Acute hyperglycemia 09/24/2022      Active Hospital Problems    Diagnosis Date Noted    New onset left bundle branch block (LBBB) [I44.7] 09/25/2022     Priority: Medium    Elevated BP without diagnosis of hypertension [R03.0] 09/25/2022     Priority: Medium    Peripheral neuropathy [G62.9] 09/25/2022     Priority: Medium    New onset type 2 diabetes mellitus ([facility] [E11.9] 09/24/2022     Priority: Medium    Acute hyperglycemia [R73.9] 09/24/2022     Priority: Medium       Assessment:       Plan:    -New left bundle branch block, history of WPW pattern on EKG    His EKG shows new left bundle branch block but the previous EKG is from 2011 and therefore it is difficult to know when this change happened. There is at least 1 beat that seems to be a P wave with a shorter TX interval and possibly a delta wave. It is similar to the WPW pattern that was seen on EKG from 2010. He had WPW morphology on a previous EKG but it has mostly resolved. Nevertheless, he has not had any arrhythmias or symptoms related to it. Given the new left bundle branch block, hypertension, and likely untreated diabetes for at long time, I will proceed with a stress test to rule out presence of any coronary disease. Echocardiogram will also be ordered. A Holter monitor for 7 to 14 days can be also done at time of discharge to assess for any arrhythmias.    -Hypertension    BP is elevated. Lisinopril was started. We will monitor and adjust dose as needed. -Hyponatremia    This could be pseudohyponatremia due to hyperglycemia and also due to poor intake. Continue IV hydration.      -Hypokalemia  Replace and monitor. Thank you for allowing me to participate in the care of Lis Coleman     NOTE: This report was transcribed using voice recognition software. Every effort was made to ensure accuracy, however, inadvertent computerized transcription errors may be present.

## 2022-09-26 ENCOUNTER — APPOINTMENT (OUTPATIENT)
Dept: NUCLEAR MEDICINE | Age: 52
DRG: 637 | End: 2022-09-26
Payer: COMMERCIAL

## 2022-09-26 ENCOUNTER — TELEPHONE (OUTPATIENT)
Dept: CARDIOLOGY CLINIC | Age: 52
End: 2022-09-26

## 2022-09-26 PROBLEM — R94.39 ABNORMAL CARDIOVASCULAR STRESS TEST: Status: ACTIVE | Noted: 2022-09-26

## 2022-09-26 LAB
ANION GAP SERPL CALCULATED.3IONS-SCNC: 10 MMOL/L (ref 3–16)
BUN BLDV-MCNC: 15 MG/DL (ref 7–20)
CALCIUM SERPL-MCNC: 9.9 MG/DL (ref 8.3–10.6)
CHLORIDE BLD-SCNC: 100 MMOL/L (ref 99–110)
CHOLESTEROL, TOTAL: 206 MG/DL (ref 0–199)
CO2: 27 MMOL/L (ref 21–32)
CREAT SERPL-MCNC: 0.8 MG/DL (ref 0.9–1.3)
GFR AFRICAN AMERICAN: >60
GFR NON-AFRICAN AMERICAN: >60
GLUCOSE BLD-MCNC: 215 MG/DL (ref 70–99)
GLUCOSE BLD-MCNC: 234 MG/DL (ref 70–99)
GLUCOSE BLD-MCNC: 237 MG/DL (ref 70–99)
GLUCOSE BLD-MCNC: 248 MG/DL (ref 70–99)
HDLC SERPL-MCNC: 40 MG/DL (ref 40–60)
LDL CHOLESTEROL CALCULATED: 126 MG/DL
LV EF: 36 %
LVEF MODALITY: NORMAL
PERFORMED ON: ABNORMAL
POTASSIUM REFLEX MAGNESIUM: 4.2 MMOL/L (ref 3.5–5.1)
SODIUM BLD-SCNC: 137 MMOL/L (ref 136–145)
TRIGL SERPL-MCNC: 199 MG/DL (ref 0–150)
TROPONIN: <0.01 NG/ML
VLDLC SERPL CALC-MCNC: 40 MG/DL

## 2022-09-26 PROCEDURE — A9502 TC99M TETROFOSMIN: HCPCS | Performed by: INTERNAL MEDICINE

## 2022-09-26 PROCEDURE — 6370000000 HC RX 637 (ALT 250 FOR IP): Performed by: NURSE PRACTITIONER

## 2022-09-26 PROCEDURE — 6370000000 HC RX 637 (ALT 250 FOR IP): Performed by: HOSPITALIST

## 2022-09-26 PROCEDURE — G0378 HOSPITAL OBSERVATION PER HR: HCPCS

## 2022-09-26 PROCEDURE — 99233 SBSQ HOSP IP/OBS HIGH 50: CPT | Performed by: INTERNAL MEDICINE

## 2022-09-26 PROCEDURE — 80061 LIPID PANEL: CPT

## 2022-09-26 PROCEDURE — 78452 HT MUSCLE IMAGE SPECT MULT: CPT

## 2022-09-26 PROCEDURE — 93017 CV STRESS TEST TRACING ONLY: CPT

## 2022-09-26 PROCEDURE — 3430000000 HC RX DIAGNOSTIC RADIOPHARMACEUTICAL: Performed by: INTERNAL MEDICINE

## 2022-09-26 PROCEDURE — 36415 COLL VENOUS BLD VENIPUNCTURE: CPT

## 2022-09-26 PROCEDURE — 80048 BASIC METABOLIC PNL TOTAL CA: CPT

## 2022-09-26 PROCEDURE — 6360000002 HC RX W HCPCS: Performed by: INTERNAL MEDICINE

## 2022-09-26 PROCEDURE — 2580000003 HC RX 258: Performed by: HOSPITALIST

## 2022-09-26 PROCEDURE — 84484 ASSAY OF TROPONIN QUANT: CPT

## 2022-09-26 PROCEDURE — 99215 OFFICE O/P EST HI 40 MIN: CPT | Performed by: INTERNAL MEDICINE

## 2022-09-26 RX ORDER — ATORVASTATIN CALCIUM 40 MG/1
40 TABLET, FILM COATED ORAL NIGHTLY
Status: DISCONTINUED | OUTPATIENT
Start: 2022-09-26 | End: 2022-09-28 | Stop reason: HOSPADM

## 2022-09-26 RX ADMIN — REGADENOSON 0.4 MG: 0.08 INJECTION, SOLUTION INTRAVENOUS at 12:28

## 2022-09-26 RX ADMIN — INSULIN LISPRO 2 UNITS: 100 INJECTION, SOLUTION INTRAVENOUS; SUBCUTANEOUS at 19:07

## 2022-09-26 RX ADMIN — SODIUM CHLORIDE, PRESERVATIVE FREE 10 ML: 5 INJECTION INTRAVENOUS at 21:42

## 2022-09-26 RX ADMIN — ATORVASTATIN CALCIUM 40 MG: 40 TABLET, FILM COATED ORAL at 21:12

## 2022-09-26 RX ADMIN — INSULIN GLARGINE 15 UNITS: 100 INJECTION, SOLUTION SUBCUTANEOUS at 21:12

## 2022-09-26 RX ADMIN — INSULIN LISPRO 4 UNITS: 100 INJECTION, SOLUTION INTRAVENOUS; SUBCUTANEOUS at 19:05

## 2022-09-26 RX ADMIN — SODIUM CHLORIDE, PRESERVATIVE FREE 10 ML: 5 INJECTION INTRAVENOUS at 08:37

## 2022-09-26 RX ADMIN — TETROFOSMIN 11.1 MILLICURIE: 1.38 INJECTION, POWDER, LYOPHILIZED, FOR SOLUTION INTRAVENOUS at 10:39

## 2022-09-26 RX ADMIN — TETROFOSMIN 33 MILLICURIE: 1.38 INJECTION, POWDER, LYOPHILIZED, FOR SOLUTION INTRAVENOUS at 12:28

## 2022-09-26 ASSESSMENT — PAIN SCALES - GENERAL: PAINLEVEL_OUTOF10: 0

## 2022-09-26 NOTE — CARE COORDINATION
CASE MANAGEMENT INITIAL ASSESSMENT    Reviewed chart and completed assessment with patient's mother via telephone, as pt was off unit when CM attempted to see  Family present:    Explained Case Management role/services. yes    Primary contact information: parents listed below    Health Care Decision Maker :   Primary Decision Maker: Hellen Lugo - Parent - [phone]    Primary Decision Maker: Joyce Lugo - Parent - [phone]        Admit date/status: OBS 9/24/22  Diagnosis: new onset CM     Is this a Readmission?:  No      Insurance: [employer] required for SNF: Yes       3 night stay required: No    Living arrangements, Adls, care needs, prior to admission: lives alone in first floor apt. IPTA. Active     Durable Medical Equipment at home: none    Services in the home and/or outpatient, prior to admission: none    Current PCP: Dr Nadia Nielsen MD - mother unsure if provider would see patient post discharge, as \"he hasn't been to a doctor in years\"            Medications: Prescription coverage? Yes Will pt require financial assistance with medications No     Transportation needs:  mother states Yamilka Trujillo has no way home\" but, then adds he \"drove himself to the hospital\" If pt is feeling well enough, he was provide his own transport home     PT/OT recs: [facility] Exemption Notification (HEN): na    Barriers to discharge: none    Plan/comments: pt likely to dc home without needs from CM. Since he is a new diabetic, he will need f/u. CM explained to mother [facility] [phone]. Information added to AVS. In addition, [facility] Beds to be utilized to fill patient's prescriptions.       Liz Ledezma RN

## 2022-09-26 NOTE — PROGRESS NOTES
[facility]          Cardiology                                                                Progress Note    Admission date:  2022    Subjective:   CC LBBB  HPI pt feels better. Myoview GXT today abnormal with fixed scar and LV dysfunction. EC G difficult to interpret for ischemic change with LBBB and intermittent preexcitation. Vitals:  Blood pressure 120/74, pulse 76, temperature 98.5 °F (36.9 °C), temperature source Oral, resp. rate 17, height 6' 1\" (1.854 m), weight 157 lb 6.5 oz (71.4 kg), SpO2 100 %.   Temp  Av.2 °F (36.8 °C)  Min: 97.7 °F (36.5 °C)  Max: 98.7 °F (37.1 °C)  Pulse  Av  Min: 64  Max: 77  BP  Min: 99/64  Max: 125/79  SpO2  Av.8 %  Min: 97 %  Max: 100 %    24 hour I/O    Intake/Output Summary (Last 24 hours) at 2022 1719  Last data filed at 2022 2313  Gross per 24 hour   Intake 340 ml   Output --   Net 340 ml       Objective:     Telemetry monitor: SR with LBBB    Physical Exam:  General Appearance:  comfortable  HEENT: pupils equal and reactive, no scleral  icterus  Skin:  Warm and dry  Heart:  Regular, normal apex, S1 and S2 normal  Lungs:  Clear, no wheezing  Abd: soft, non tender  Extremities:  No edema, no cyanosis  Psych: normal affect, oriented X 3      Lab Review     Renal Profile:   Lab Results   Component Value Date/Time    CREATININE 0.8 2022 05:50 AM    BUN 15 2022 05:50 AM     2022 05:50 AM    K 4.2 2022 05:50 AM     2022 05:50 AM    CO2 27 2022 05:50 AM     CBC:    Lab Results   Component Value Date/Time    WBC 7.1 2022 04:24 AM    RBC 4.40 2022 04:24 AM    HGB 13.6 2022 04:24 AM    HCT 39.9 2022 04:24 AM    MCV 90.5 2022 04:24 AM    RDW 12.8 2022 04:24 AM     2022 04:24 AM     BNP:  No results found for: BNP  Fasting Lipid Panel:    Lab Results   Component Value Date/Time    CHOL 206 2022 05:50 AM    HDL 40 2022 05:50 AM    HDL 22 03/04/2010 08:41 AM    TRIG 199 09/26/2022 05:50 AM     Cardiac Enzymes:  CK/MbTroponin  Lab Results   Component Value Date/Time    CKMB 0.49 03/09/2010 07:40 AM    TROPONINI <0.01 09/26/2022 04:13 PM     PT/ INR   Lab Results   Component Value Date/Time    INR 1.27 03/09/2010 07:40 AM    PROTIME 14.5 03/09/2010 07:40 AM     PTT No results found for: PTT   Lab Results   Component Value Date/Time    MG 2.00 09/25/2022 04:24 AM    No results found for: TSH    Assessment:     Abnormal stress test-findings consistent with multivessel disease. [facility] planned 9/27/22. LBBB- onset unknown. WPW- he has intermittent preexcitation, suggesting that he is not at risk for rapidly conducted preexcited AF.   DM  HTN    Plan:     HTN and DM management as outlined  [facility] 9/27/22      Dahlia Dalton MD

## 2022-09-26 NOTE — CONSULTS
[address] [facility]   Cardiovascular Evaluation    PATIENT: Dontrell Anain  DATE: 2022  MRN: [id number]  CSN: [id number]  : 1970    Primary Care Doctor/Referring provider: Thalia Weber MD, Gaby Fry MD     Reason for evaluation/Chief complaint:   Hyperglycemia (Pt to ED with c/o hyperglycemia, glucose in 400s at home. Pt not diagnosed diabetic but used dads meter and sugar read in 400s. Pt reports increased urination, feeling thirsty, generalized weakness pt reports for years.)      Subjective:    History of present illness on initial date of evaluation:   Dontrell Anain is a 46 y.o. patient who presents to the hospital for evaluation of feeling poorly including weakness, increased urination and thirstiness. Patient was diagnosed with hyperglycemia and separately admitted to the hospital.  Patient has previously not been diagnosed with diabetes or cardiovascular illness. As part of his work-up here in the hospital he underwent an EKG. His EKG demonstrated a left bundle branch block. Patient was subsequently referred for stress testing. Patient had a stress test this morning which demonstrates severely reduced left ventricular function with concerns of transient ischemic dilatation along with defect within the inferolateral wall. Interventional cardiology is asked see the patient for further recommendations and management. Patient somewhat of a poor historian cannot give detailed information regarding history of present illness. He does state that he has noted exercise intolerance. 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 He is noted that he cannot complete what he used to be able to do. .        Patient Active Problem List   Diagnosis    New onset type 2 diabetes mellitus ([facility] [address]    Acute hyperglycemia    LBBB (left bundle branch block)    Elevated BP without diagnosis of hypertension    Peripheral neuropathy    Benign essential HTN Hyponatremia    Hypokalemia    Abnormal cardiovascular stress test         Cardiac Testing: I have reviewed the findings below. EKG:  ECHO:   STRESS TEST:  CATH:  BYPASS:  VASCULAR:    Past Medical History:   has a past medical history of GERD (gastroesophageal reflux disease) and WPW (Dorothy-Parkinson-White syndrome). Surgical History:   has a past surgical history that includes hernia repair. Social History:   reports that he has never smoked. He has never used smokeless tobacco. He reports that he does not drink alcohol and does not use drugs. Family History:  No evidence for sudden cardiac death or premature CAD    Medications:  Reviewed and are listed in nursing record. and/or listed below  Outpatient Medications:  Prior to Admission medications    Medication Sig Start Date End Date Taking? Authorizing Provider   Cholecalciferol (VITAMIN D3) 5000 UNITS TABS Take by mouth  Patient not taking: Reported on 9/24/2022    Historical Provider, MD   ibuprofen (ADVIL;MOTRIN) 800 MG tablet Take 1 tablet by mouth every 8 hours as needed for Pain or Fever 5/16/17   FAINA Morgan - CNP   lidocaine (LIDODERM) 5 % Place 1 patch onto the skin daily 12 hours on, 12 hours off. 5/16/17   FAINA Morgan - CNP   Ascorbic Acid (VITAMIN C) 500 MG tablet Take 500 mg by mouth daily. Patient not taking: Reported on 9/24/2022    Historical Provider, MD   vitamin E 1000 UNIT capsule Take 1,000 Units by mouth daily. Patient not taking: Reported on 9/24/2022    Historical Provider, MD   [facility] by Does not apply route.     Patient not taking: Reported on 9/24/2022    Historical Provider, MD       In-patient schedule medications:   insulin glargine  15 Units SubCUTAneous Nightly    insulin lispro  0-8 Units SubCUTAneous TID WC    insulin lispro  0-4 Units SubCUTAneous Nightly    insulin lispro  0.05 Units/kg SubCUTAneous TID     sodium chloride flush  10 mL IntraVENous 2 times per day    enoxaparin  40 mg SubCUTAneous Daily    lisinopril  5 mg Oral Daily    aspirin  81 mg Oral Daily         Infusion Medications:   dextrose      sodium chloride           Allergies:  Penicillins     Review of Systems:   All 14 point review of symptoms completed. Pertinent positives identified in the HPI, all other review of symptoms findings as below.      Review of Systems - History obtained from the patient  General ROS: negative for - chills, fever or night sweats  Psychological ROS: negative for - disorientation or hallucinations  Ophthalmic ROS: negative for - dry eyes, eye pain or loss of vision  ENT ROS: negative for - nasal discharge or sore throat  Allergy and Immunology ROS: negative for - hives or itchy/watery eyes  Hematological and Lymphatic ROS: negative for - jaundice or night sweats  Endocrine ROS: negative for - mood swings or temperature intolerance  Breast ROS: deferred  Respiratory ROS: negative for - hemoptysis or stridor  Gastrointestinal ROS: no abdominal pain, change in bowel habits, or black or bloody stools  Genito-Urinary ROS: no dysuria, trouble voiding, or hematuria  Musculoskeletal ROS: negative for - gait disturbance, joint pain or joint stiffness  Neurological ROS: negative for - seizures or speech problems  Dermatological ROS: negative for - rash or skin lesion changes      Physical Examination:    [unfilled]  /74   Pulse 76   Temp 97.8 °F (36.6 °C) (Oral)   Resp 16   Ht 6' 1\" (1.854 m)   Wt 157 lb 6.5 oz (71.4 kg)   SpO2 97%   BMI 20.77 kg/m²    Weight: 157 lb 6.5 oz (71.4 kg)     Wt Readings from Last 3 Encounters:   09/26/22 157 lb 6.5 oz (71.4 kg)   05/16/17 189 lb (85.7 kg)       Intake/Output Summary (Last 24 hours) at 9/26/2022 1521  Last data filed at 9/25/2022 2313  Gross per 24 hour   Intake 340 ml   Output --   Net 340 ml       General Appearance:  Alert, cooperative, no distress, appears stated age   Head:  Normocephalic, without obvious abnormality, atraumatic   Eyes:  PERRL, conjunctiva/corneas clear       Nose: Nares normal, no drainage or sinus tenderness   Throat: Lips, mucosa, and tongue normal   Neck: Supple, symmetrical, trachea midline, no adenopathy, thyroid: not enlarged, symmetric, no tenderness/mass/nodules, no carotid bruit or JVD       Lungs:   Clear to auscultation bilaterally, respirations unlabored   Chest Wall:  No tenderness or deformity   Heart:  Regular rhythm and normal rate; S1, S2 are normal; no murmur noted; no rub or gallop   Abdomen:   Soft, non-tender, bowel sounds active all four quadrants,  no masses, no organomegaly           Extremities: Extremities normal, atraumatic, no cyanosis or edema   Pulses: 2+ and symmetric   Skin: Skin color, texture, turgor normal, no rashes or lesions   Pysch: Normal mood and affect   Neurologic: Normal gross motor and sensory exam.         Labs  Recent Labs     09/24/22  1553 09/25/22  0424   WBC 7.0 7.1   HGB 16.0 13.6   HCT 47.6 39.9*   MCV 91.0 90.5    264     Recent Labs     09/25/22  1153 09/26/22  0550   CREATININE 0.7* 0.8*   BUN 10 15   * 137   K 3.7 4.2    100   CO2 24 27     No results for input(s): INR, PROTIME in the last 72 hours. No results for input(s): TROPONINI in the last 72 hours. Invalid input(s): PRO-BNP  Recent Labs     09/26/22  0550   CHOL 206*   HDL 40         Imaging:  I have reviewed the below testing personally and my interpretation is below. EKG:  CXR:      Assessment:  46 y.o. patient with:      Acute hyperglycemia    LBBB (left bundle branch block)    Abnormal cardiovascular stress test        Problem List Items Addressed This Visit       * (Principal) New onset type 2 diabetes mellitus ([facility] [address] - Primary       Plan:  1. I had the opportunity to review the Jo Sadler clinical presentation and the available pertinent data. With the patient's clinical risk factors and symptoms, there is a high pretest likelihood for CAD.  I have asked Jo Sadler to undergo cardiac angiography for further diagnostic testing. The procedure was explained in depth. All questions and alternate treatment strategies were discussed. The patient agrees to proceed. They understand all the risks associated with the procedure, including myocardial infarction, stroke, death, vascular complications, and the possible need for emergent surgery. 2. Serial troponin levels will be ordered and reviewed  3. The patient has been given instructions on addressing diet, regular exercise, weight control, smoking abstention, medication compliance, and stress minimization. 4. The patient should be treated with these therapies unless contraindicated:   ~asa daily   ~beta-blockers   ~statin therapy   ~ACE/ARB   ~repeat troponin until down trending   ~EKG as clinically needed  5. Keep NPO   6. Pre-cath orders will be placed. Medical Decision Making: The following items were considered in medical decision making:  Independent review of images  Review / order clinical lab tests  Review / order radiology tests  Decision to obtain old records  Review and summation of old records as accessed through [facility] (a summary of my findings in these old records)            All questions and concerns were addressed to the patient/family. Alternatives to my treatment were discussed. The note was completed using EMR. Every effort was made to ensure accuracy; however, inadvertent computerized transcription errors may be present.     Daljit Rubio MD, [address], [address] [place], [place]  [phone] [facility]  [phone] [facility]  9/26/2022  3:21 PM

## 2022-09-26 NOTE — PROGRESS NOTES
Hospitalist Progress Note      PCP: Megan Sheth MD    Date of Admission: 9/24/2022    Chief Complaint: Hyperglycemia    Hospital Course: Yoko Mcnamara is a 46 y.o. male who presented to the ED to be evaluated for concerns of new onset diabetes. He reports that for the past several years he has experienced polyuria, polydipsia, polyphagia, and generalized weakness. His father, who is diabetic, allowed him to use his own glucometer; results revealed multiple glucose readings greater than 400. Upon further questioning patient also endorses blurred vision and symptoms of BLE neuropathy. He reports that his only known medical diagnoses prior to today include WPW and GERD. Upon arrival to the ED EKG was obtained revealing NSR with fusion complexes and PACs; new LBBB identified. CXR negative for acute cardiopulmonary findings. Notable labs include: Pseudohyponatremia 129, hyperglycemia 574, and lipase 68. Patient received NS bolus per ED attending as well as 4 units of IV Humulin prior to request for admission. Subjective: NAD, denies cp/sob. Updated on plan of care.        Medications:  Reviewed    Infusion Medications    dextrose      sodium chloride       Scheduled Medications    insulin glargine  15 Units SubCUTAneous Nightly    insulin lispro  0-8 Units SubCUTAneous TID WC    insulin lispro  0-4 Units SubCUTAneous Nightly    insulin lispro  0.05 Units/kg SubCUTAneous TID WC    sodium chloride flush  10 mL IntraVENous 2 times per day    enoxaparin  40 mg SubCUTAneous Daily    lisinopril  5 mg Oral Daily    aspirin  81 mg Oral Daily     PRN Meds: regadenoson, glucose, dextrose bolus **OR** dextrose bolus, glucagon (rDNA), dextrose, sodium chloride flush, sodium chloride, potassium chloride **OR** potassium alternative oral replacement **OR** potassium chloride, magnesium sulfate, promethazine **OR** ondansetron, senna, acetaminophen **OR** acetaminophen, perflutren lipid microspheres, Results   Component Value Date/Time    NITRU Negative 09/24/2022 04:08 PM    BLOODU Negative 09/24/2022 04:08 PM    SPECGRAV <=1.005 09/24/2022 04:08 PM    GLUCOSEU >=1000 09/24/2022 04:08 PM       Radiology:  XR CHEST PORTABLE   Final Result   Stable chronic changes with no acute abnormality seen.          NM MYOCARDIAL SPECT REST EXERCISE OR RX    (Results Pending)           Assessment/Plan:    Active Hospital Problems    Diagnosis     LBBB (left bundle branch block) [I44.7]      Priority: Medium    Elevated BP without diagnosis of hypertension [R03.0]      Priority: Medium    Peripheral neuropathy [G62.9]      Priority: Medium    Benign essential HTN [I10]      Priority: Medium    Hyponatremia [E87.1]      Priority: Medium    Hypokalemia [E87.6]      Priority: Medium    New onset type 2 diabetes mellitus ([facility] [address] [E11.9]      Priority: Medium    Acute hyperglycemia [R73.9]      Priority: Medium     Newly diagnosed type II DM with acute hyperglycemia  -A1c 13.6 (plan to dc on metformin, Insulin-needs glucometer, strips and lancets)  -Patient on no hypoglycemic agents PTA  - Initiate weight-based Lantus insulin 15 units nightly  -Humalog scheduled AC/HS in addition to PRN SSI coverage  -Carbohydrate restriction placed on diet  -Consultation placed to Nutrition for ADA dietary education      BP elevation with new LBBB  -Patient admitted to telemetry floor for continuous monitoring during stay  -EKG obtained in ED reviewed personally and notable for new LBBB  -Cardiology consulted-recommend stress test in am  -Recommend initiation of oral lisinopril in the setting of newly diagnosed DM  -IV labetalol scheduled PRN (with parameters) to address extreme BP elevation  -Serial cardiac enzymes will be trended overnight; BNP pending  -ECHO scheduled in a.m. to further assess cardiac structure and function     DVT Prophylaxis: Lovenox  Diet: Diet NPO  Code Status: Full Code  PT/OT Eval Status: ambulatory    Dispo - pending stress test/echo.      Appropriate for A1 Discharge Unit: No      Cha Delay, APRN - CNP

## 2022-09-26 NOTE — CONSULTS
RD received consult for DM education. Pt unavailable after multiple attempts. Left handouts at [facility]. Will follow up when medically appropriate. Will continue to monitor per nutrition standards of care.      Ashley Ling MS, RD, LD on 9/26/2022 at 1:48 PM  Office: [phone]

## 2022-09-26 NOTE — TELEPHONE ENCOUNTER
[phone], Girma Anthony  1970, RM #[id number], Abnormal stress test results

## 2022-09-27 ENCOUNTER — APPOINTMENT (OUTPATIENT)
Dept: CARDIAC CATH/INVASIVE PROCEDURES | Age: 52
DRG: 637 | End: 2022-09-27
Payer: COMMERCIAL

## 2022-09-27 LAB
ANION GAP SERPL CALCULATED.3IONS-SCNC: 11 MMOL/L (ref 3–16)
BUN BLDV-MCNC: 21 MG/DL (ref 7–20)
CALCIUM SERPL-MCNC: 9.4 MG/DL (ref 8.3–10.6)
CHLORIDE BLD-SCNC: 101 MMOL/L (ref 99–110)
CO2: 23 MMOL/L (ref 21–32)
CREAT SERPL-MCNC: 0.8 MG/DL (ref 0.9–1.3)
GFR AFRICAN AMERICAN: >60
GFR NON-AFRICAN AMERICAN: >60
GLUCOSE BLD-MCNC: 158 MG/DL (ref 70–99)
GLUCOSE BLD-MCNC: 198 MG/DL (ref 70–99)
GLUCOSE BLD-MCNC: 231 MG/DL (ref 70–99)
GLUCOSE BLD-MCNC: 347 MG/DL (ref 70–99)
LV EF: 38 %
LV EF: 40 %
LVEF MODALITY: NORMAL
LVEF MODALITY: NORMAL
PERFORMED ON: ABNORMAL
POTASSIUM REFLEX MAGNESIUM: 3.8 MMOL/L (ref 3.5–5.1)
PRO-BNP: 49 PG/ML (ref 0–124)
SODIUM BLD-SCNC: 135 MMOL/L (ref 136–145)

## 2022-09-27 PROCEDURE — 93458 L HRT ARTERY/VENTRICLE ANGIO: CPT | Performed by: INTERNAL MEDICINE

## 2022-09-27 PROCEDURE — B2111ZZ FLUOROSCOPY OF MULTIPLE CORONARY ARTERIES USING LOW OSMOLAR CONTRAST: ICD-10-PCS | Performed by: INTERNAL MEDICINE

## 2022-09-27 PROCEDURE — C1894 INTRO/SHEATH, NON-LASER: HCPCS

## 2022-09-27 PROCEDURE — 93458 L HRT ARTERY/VENTRICLE ANGIO: CPT

## 2022-09-27 PROCEDURE — 2580000003 HC RX 258: Performed by: INTERNAL MEDICINE

## 2022-09-27 PROCEDURE — 6370000000 HC RX 637 (ALT 250 FOR IP): Performed by: INTERNAL MEDICINE

## 2022-09-27 PROCEDURE — 99226 PR SBSQ OBSERVATION CARE/DAY 35 MINUTES: CPT | Performed by: INTERNAL MEDICINE

## 2022-09-27 PROCEDURE — 6360000004 HC RX CONTRAST MEDICATION

## 2022-09-27 PROCEDURE — G0378 HOSPITAL OBSERVATION PER HR: HCPCS

## 2022-09-27 PROCEDURE — 6360000002 HC RX W HCPCS: Performed by: HOSPITALIST

## 2022-09-27 PROCEDURE — 96372 THER/PROPH/DIAG INJ SC/IM: CPT

## 2022-09-27 PROCEDURE — C1887 CATHETER, GUIDING: HCPCS

## 2022-09-27 PROCEDURE — B2151ZZ FLUOROSCOPY OF LEFT HEART USING LOW OSMOLAR CONTRAST: ICD-10-PCS | Performed by: INTERNAL MEDICINE

## 2022-09-27 PROCEDURE — 2500000003 HC RX 250 WO HCPCS

## 2022-09-27 PROCEDURE — 4A023N7 MEASUREMENT OF CARDIAC SAMPLING AND PRESSURE, LEFT HEART, PERCUTANEOUS APPROACH: ICD-10-PCS | Performed by: INTERNAL MEDICINE

## 2022-09-27 PROCEDURE — 6370000000 HC RX 637 (ALT 250 FOR IP): Performed by: HOSPITALIST

## 2022-09-27 PROCEDURE — 99152 MOD SED SAME PHYS/QHP 5/>YRS: CPT | Performed by: INTERNAL MEDICINE

## 2022-09-27 PROCEDURE — 36415 COLL VENOUS BLD VENIPUNCTURE: CPT

## 2022-09-27 PROCEDURE — 6360000002 HC RX W HCPCS

## 2022-09-27 PROCEDURE — C1769 GUIDE WIRE: HCPCS

## 2022-09-27 PROCEDURE — 83880 ASSAY OF NATRIURETIC PEPTIDE: CPT

## 2022-09-27 PROCEDURE — 2580000003 HC RX 258: Performed by: HOSPITALIST

## 2022-09-27 PROCEDURE — 2709999900 HC NON-CHARGEABLE SUPPLY

## 2022-09-27 PROCEDURE — 80048 BASIC METABOLIC PNL TOTAL CA: CPT

## 2022-09-27 PROCEDURE — 93306 TTE W/DOPPLER COMPLETE: CPT

## 2022-09-27 RX ORDER — METOPROLOL SUCCINATE 25 MG/1
12.5 TABLET, EXTENDED RELEASE ORAL DAILY
Status: DISCONTINUED | OUTPATIENT
Start: 2022-09-27 | End: 2022-09-28

## 2022-09-27 RX ORDER — SODIUM CHLORIDE 9 MG/ML
INJECTION, SOLUTION INTRAVENOUS CONTINUOUS
Status: ACTIVE | OUTPATIENT
Start: 2022-09-27 | End: 2022-09-27

## 2022-09-27 RX ORDER — SODIUM CHLORIDE 9 MG/ML
INJECTION, SOLUTION INTRAVENOUS PRN
Status: DISCONTINUED | OUTPATIENT
Start: 2022-09-27 | End: 2022-09-28 | Stop reason: HOSPADM

## 2022-09-27 RX ORDER — SODIUM CHLORIDE 0.9 % (FLUSH) 0.9 %
5-40 SYRINGE (ML) INJECTION PRN
Status: DISCONTINUED | OUTPATIENT
Start: 2022-09-27 | End: 2022-09-28 | Stop reason: HOSPADM

## 2022-09-27 RX ORDER — MIDAZOLAM HYDROCHLORIDE 1 MG/ML
INJECTION INTRAMUSCULAR; INTRAVENOUS
Status: COMPLETED | OUTPATIENT
Start: 2022-09-27 | End: 2022-09-27

## 2022-09-27 RX ORDER — HYDRALAZINE HYDROCHLORIDE 20 MG/ML
10 INJECTION INTRAMUSCULAR; INTRAVENOUS EVERY 10 MIN PRN
Status: DISCONTINUED | OUTPATIENT
Start: 2022-09-27 | End: 2022-09-28 | Stop reason: HOSPADM

## 2022-09-27 RX ORDER — FENTANYL CITRATE 50 UG/ML
INJECTION, SOLUTION INTRAMUSCULAR; INTRAVENOUS
Status: COMPLETED | OUTPATIENT
Start: 2022-09-27 | End: 2022-09-27

## 2022-09-27 RX ORDER — SODIUM CHLORIDE 0.9 % (FLUSH) 0.9 %
5-40 SYRINGE (ML) INJECTION EVERY 12 HOURS SCHEDULED
Status: DISCONTINUED | OUTPATIENT
Start: 2022-09-27 | End: 2022-09-28 | Stop reason: HOSPADM

## 2022-09-27 RX ADMIN — MIDAZOLAM HYDROCHLORIDE 1 MG: 1 INJECTION INTRAMUSCULAR; INTRAVENOUS at 10:31

## 2022-09-27 RX ADMIN — INSULIN LISPRO 6 UNITS: 100 INJECTION, SOLUTION INTRAVENOUS; SUBCUTANEOUS at 16:27

## 2022-09-27 RX ADMIN — FENTANYL CITRATE 25 MCG: 50 INJECTION, SOLUTION INTRAMUSCULAR; INTRAVENOUS at 10:32

## 2022-09-27 RX ADMIN — ATORVASTATIN CALCIUM 40 MG: 40 TABLET, FILM COATED ORAL at 20:04

## 2022-09-27 RX ADMIN — INSULIN LISPRO 4 UNITS: 100 INJECTION, SOLUTION INTRAVENOUS; SUBCUTANEOUS at 16:29

## 2022-09-27 RX ADMIN — INSULIN GLARGINE 15 UNITS: 100 INJECTION, SOLUTION SUBCUTANEOUS at 20:04

## 2022-09-27 RX ADMIN — MIDAZOLAM HYDROCHLORIDE 2 MG: 1 INJECTION INTRAMUSCULAR; INTRAVENOUS at 10:22

## 2022-09-27 RX ADMIN — FENTANYL CITRATE 25 MCG: 50 INJECTION, SOLUTION INTRAMUSCULAR; INTRAVENOUS at 10:23

## 2022-09-27 RX ADMIN — ASPIRIN 81 MG: 81 TABLET, COATED ORAL at 08:28

## 2022-09-27 RX ADMIN — ENOXAPARIN SODIUM 40 MG: 100 INJECTION SUBCUTANEOUS at 08:28

## 2022-09-27 RX ADMIN — SODIUM CHLORIDE: 9 INJECTION, SOLUTION INTRAVENOUS at 14:01

## 2022-09-27 RX ADMIN — SODIUM CHLORIDE, PRESERVATIVE FREE 10 ML: 5 INJECTION INTRAVENOUS at 08:31

## 2022-09-27 RX ADMIN — LISINOPRIL 5 MG: 10 TABLET ORAL at 08:28

## 2022-09-27 NOTE — PROGRESS NOTES
Hospitalist Progress Note      PCP: Jen Kamara MD    Date of Admission: 9/24/2022    Chief Complaint: Hyperglycemia    Hospital Course: Elise Bautista is a 46 y.o. male who presented to the ED to be evaluated for concerns of new onset diabetes. He reports that for the past several years he has experienced polyuria, polydipsia, polyphagia, and generalized weakness. His father, who is diabetic, allowed him to use his own glucometer; results revealed multiple glucose readings greater than 400. Upon further questioning patient also endorses blurred vision and symptoms of BLE neuropathy. He reports that his only known medical diagnoses prior to today include WPW and GERD. Upon arrival to the ED EKG was obtained revealing NSR with fusion complexes and PACs; new LBBB identified. CXR negative for acute cardiopulmonary findings. Notable labs include: Pseudohyponatremia 129, hyperglycemia 574, and lipase 68. Patient received NS bolus per ED attending as well as 4 units of IV Humulin prior to request for admission. Subjective: NAD, denies cp/sob. Updated on plan of care. [facility] today.       Medications:  Reviewed    Infusion Medications    dextrose      sodium chloride       Scheduled Medications    atorvastatin  40 mg Oral Nightly    insulin glargine  15 Units SubCUTAneous Nightly    insulin lispro  0-8 Units SubCUTAneous TID WC    insulin lispro  0-4 Units SubCUTAneous Nightly    insulin lispro  0.05 Units/kg SubCUTAneous TID WC    sodium chloride flush  10 mL IntraVENous 2 times per day    enoxaparin  40 mg SubCUTAneous Daily    lisinopril  5 mg Oral Daily    aspirin  81 mg Oral Daily     PRN Meds: glucose, dextrose bolus **OR** dextrose bolus, glucagon (rDNA), dextrose, sodium chloride flush, sodium chloride, potassium chloride **OR** potassium alternative oral replacement **OR** potassium chloride, magnesium sulfate, promethazine **OR** ondansetron, senna, acetaminophen **OR** acetaminophen, perflutren lipid microspheres, labetalol    No intake or output data in the 24 hours ending 09/27/22 1011      Physical Exam Performed:    /82   Pulse 58   Temp 97.4 °F (36.3 °C) (Oral)   Resp 18   Ht 6' 1\" (1.854 m)   Wt 155 lb 8 oz (70.5 kg)   SpO2 97%   BMI 20.52 kg/m²     General appearance: No apparent distress, appears stated age and cooperative. HEENT: Pupils equal, round, and reactive to light. Conjunctivae/corneas clear. Neck: Supple, with full range of motion. No jugular venous distention. Trachea midline. Respiratory:  Normal respiratory effort. Clear to auscultation, bilaterally without Rales/Wheezes/Rhonchi. Cardiovascular: Regular rate and rhythm with normal S1/S2 without murmurs, rubs or gallops. Abdomen: Soft, non-tender, non-distended with normal bowel sounds. Musculoskeletal: No clubbing, cyanosis or edema bilaterally. Full range of motion without deformity. Skin: Skin color, texture, turgor normal.  No rashes or lesions. Neurologic:  Neurovascularly intact without any focal sensory/motor deficits. Cranial nerves: II-XII intact, grossly non-focal.  Psychiatric: Alert and oriented, thought content appropriate, normal insight  Capillary Refill: Brisk, 3 seconds, normal   Peripheral Pulses: +2 palpable, equal bilaterally       Labs:   Recent Labs     09/24/22  1553 09/25/22  0424   WBC 7.0 7.1   HGB 16.0 13.6   HCT 47.6 39.9*    264       Recent Labs     09/25/22  0424 09/25/22  1153 09/26/22  0550 09/27/22  0648   * 134* 137 135*   K 3.4* 3.7 4.2 3.8    101 100 101   CO2 25 24 27 23   BUN 11 10 15 21*   CREATININE 0.7* 0.7* 0.8* 0.8*   CALCIUM 9.0 9.1 9.9 9.4   PHOS 2.4* 1.9*  --   --        Recent Labs     09/24/22  1553 09/25/22  0424   AST 15 14*   ALT 24 17   BILITOT 0.4 <0.2   ALKPHOS 205* 135*       No results for input(s): INR in the last 72 hours.   Recent Labs     09/26/22  1613   TROPONINI <0.01       Urinalysis:      Lab Results   Component Value Date/Time    NITRU Negative 09/24/2022 04:08 PM    BLOODU Negative 09/24/2022 04:08 PM    SPECGRAV <=1.005 09/24/2022 04:08 PM    GLUCOSEU >=1000 09/24/2022 04:08 PM       Radiology:  NM MYOCARDIAL SPECT REST EXERCISE OR RX   Final Result      XR CHEST PORTABLE   Final Result   Stable chronic changes with no acute abnormality seen. Assessment/Plan:    Active Hospital Problems    Diagnosis     Abnormal cardiovascular stress test [R94.39]      Priority: Medium    LBBB (left bundle branch block) [I44.7]      Priority: Medium    Elevated BP without diagnosis of hypertension [R03.0]      Priority: Medium    Peripheral neuropathy [G62.9]      Priority: Medium    Benign essential HTN [I10]      Priority: Medium    Hyponatremia [E87.1]      Priority: Medium    Hypokalemia [E87.6]      Priority: Medium    New onset type 2 diabetes mellitus ([facility] [address] [E11.9]      Priority: Medium    Acute hyperglycemia [R73.9]      Priority: Medium     Newly diagnosed type II DM with acute hyperglycemia  -A1c 13.6 (plan to dc on metformin, Insulin-needs glucometer, strips and lancets)  -Patient on no hypoglycemic agents PTA  - Initiate weight-based Lantus insulin 15 units nightly  -Humalog scheduled AC/HS in addition to PRN SSI coverage  -Carbohydrate restriction placed on diet  -Consultation placed to Nutrition for ADA dietary education      Acute sHF, new LBBB  -Patient admitted to telemetry floor for continuous monitoring during stay  -EKG obtained in ED reviewed personally and notable for new LBBB  -Cardiology consulted-abnl stress, lg fixed defect, reduced EF 36%  -Recommend initiation of oral lisinopril in the setting of newly diagnosed DM  -IV labetalol scheduled PRN (with parameters) to address extreme BP elevation  -Serial cardiac enzymes will be trended overnight; BNP     DVT Prophylaxis: Lovenox  Diet: ADULT DIET;  Regular; 4 carb choices (60 gm/meal)  Code Status: Full Code  PT/OT Eval Status: ambulatory    Dispo - pending [facility]    Appropriate for A1 Discharge Unit: Estela Choi, FAINA - CNP

## 2022-09-27 NOTE — PRE SEDATION
Brief Pre-Op Note/Sedation Assessment      Tess Potts  1970  [id number]  9:58 AM    Planned Procedure: Cardiac Catheterization Procedure  Post Procedure Plan: Return to same level of care  Consent: I have discussed with the patient and/or the patient representative the indication, alternatives, and the possible risks and/or complications of the planned procedure and the anesthesia methods. The patient and/or patient representative appear to understand and agree to proceed. Chief Complaint:   Dyspnea on Exertion      Indications for Cath Procedure:  Presentation:  New Onset Angina <= 2 months, Cardiomyopathy, and LV Dysfunction  2. Anginal Classification within 2 weeks:  CCS III - Symptoms with everyday living activities, i.e., moderate limitation  3. Angina Symptoms Assessment:  Atypical Chest Pain  4. Heart Failure Class within last 2 weeks:  Yes:  Heart Failure Type: Systolic Severity:  Class III - Symptoms of HF on less-than-ordinary exertion  5. Cardiovascular Instability:  No    Prior Ischemic Workup/Eval:  Pre-Procedural Medications: Yes: ACE/ARB/ARNI, Aspirin, Beta Blockers, and STATIN  2. Stress Test Completed? Yes:  Stress or Imaging Studies Performed (within ANY time period):   Type:  Stress Nuclear  Results:  Positive:  Myocardial Perfusion Defects (Nuclear) Extent of Ischemia:  High Risk (>3% annual death or MI)    Does Patient need surgery? Cath Valve Surgery:  No    Pre-Procedure Medical History:  Vital Signs:  /82   Pulse 58   Temp 97.4 °F (36.3 °C) (Oral)   Resp 18   Ht 6' 1\" (1.854 m)   Wt 155 lb 8 oz (70.5 kg)   SpO2 97%   BMI 20.52 kg/m²     Allergies:     Allergies   Allergen Reactions    Penicillins Hives     Medications:    Current Facility-Administered Medications   Medication Dose Route Frequency Provider Last Rate Last Admin    atorvastatin (LIPITOR) tablet 40 mg  40 mg Oral Nightly FAINA Street CNP   40 mg at 09/26/22 2110    glucose chewable tablet 16 g  4 tablet Oral PRN Candi Dutton MD        dextrose bolus 10% 125 mL  125 mL IntraVENous PRN Candi Dutton MD        Or    dextrose bolus 10% 250 mL  250 mL IntraVENous PRN Candi Dutton MD        glucagon (rDNA) injection 1 mg  1 mg SubCUTAneous PRN Candi Dutton MD        dextrose 10 % infusion   IntraVENous Continuous PRN Candi Dutton MD        insulin glargine (LANTUS) injection vial 15 Units  15 Units SubCUTAneous Nightly Candi Dutton MD   15 Units at 09/26/22 2112    insulin lispro (HUMALOG) injection vial 0-8 Units  0-8 Units SubCUTAneous TID  Candi Dutton MD   2 Units at 09/26/22 1907    insulin lispro (HUMALOG) injection vial 0-4 Units  0-4 Units SubCUTAneous Nightly Candi Dutton MD        insulin lispro (HUMALOG) injection vial 4 Units  0.05 Units/kg SubCUTAneous TID  Candi Dutton MD   4 Units at 09/26/22 1905    sodium chloride flush 0.9 % injection 10 mL  10 mL IntraVENous 2 times per day Candi Dutton MD   10 mL at 09/27/22 0831    sodium chloride flush 0.9 % injection 10 mL  10 mL IntraVENous PRN Candi Dutton MD        0.9 % sodium chloride infusion   IntraVENous PRN Candi Dutton MD        potassium chloride (KLOR-CON M) extended release tablet 40 mEq  40 mEq Oral PRN Candi Dutton MD        Or    potassium bicarb-citric acid (EFFER-K) effervescent tablet 40 mEq  40 mEq Oral PRN Candi Dutton MD        Or    potassium chloride 10 mEq/100 mL IVPB (Peripheral Line)  10 mEq IntraVENous PRN Candi Dutton MD        magnesium sulfate 2000 mg in 50 mL IVPB premix  2,000 mg IntraVENous PRN Candi Dutton MD        enoxaparin (LOVENOX) injection 40 mg  40 mg SubCUTAneous Daily Candi Dutton MD   40 mg at 09/27/22 1539    promethazine (PHENERGAN) tablet 12.5 mg  12.5 mg Oral Q6H PRN Candi Dutton MD        Or    ondansetron [facility]) injection 4 mg  4 mg IntraVENous Q6H PRN Candi Dutton MD        senna (SENOKOT) tablet 8.6 mg  1 tablet Oral Daily PRN Jazmyn Hughes MD        acetaminophen (TYLENOL) tablet 650 mg  650 mg Oral Q6H PRN Jazmyn Hughes MD   650 mg at 09/25/22 2042    Or    acetaminophen (TYLENOL) suppository 650 mg  650 mg Rectal Q6H PRN Jazmyn Hughes MD        perflutren lipid microspheres (DEFINITY) injection 1.65 mg  1.5 mL IntraVENous ONCE PRN Jazmyn Hughes MD        lisinopril (PRINIVIL;ZESTRIL) tablet 5 mg  5 mg Oral Daily Jazmyn Hughes MD   5 mg at 09/27/22 5522    labetalol (NORMODYNE;TRANDATE) injection 5 mg  5 mg IntraVENous Q4H PRN Jazmyn Hughes MD        aspirin EC tablet 81 mg  81 mg Oral Daily Jazmyn Hughes MD   81 mg at 09/27/22 0792       Past Medical History:    Past Medical History:   Diagnosis Date    GERD (gastroesophageal reflux disease)     WPW (Dorothy-Parkinson-White syndrome)        Surgical History:    Past Surgical History:   Procedure Laterality Date    HERNIA REPAIR               Pre-Sedation:  Pre-Sedation Documentation and Exam:  I have assessed the patient and reviewed the H&P on the chart. Prior History of Anesthesia Complications:   none    Modified Mallampati:  II (soft palate, uvula, fauces visible)    ASA Classification:  Class 3 - A patient with severe systemic disease that limits activity but is not incapacitating    Ronny Scale: Activity:  2 - Able to move 4 extremities voluntarily on command  Respiration:  2 - Able to breathe deeply and cough freely  Circulation:  2 - BP+/- 20mmHg of normal  Consciousness:  2 - Fully awake  Oxygen Saturation (color):  2 - Able to maintain oxygen saturation >92% on room air    Sedation/Anesthesia Plan:  Guard the patient's safety and welfare. Minimize physical discomfort and pain. Minimize negative psychological responses to treatment by providing sedation and analgesia and maximize the potential amnesia. Patient to meet pre-procedure discharge plan.     Medication Planned:  midazolam intravenously and fentanyl intravenously    Patient is an appropriate candidate for plan of sedation:   yes      Electronically signed by Casimiro Kiran MD on 9/27/2022 at 9:58 AM

## 2022-09-27 NOTE — PROGRESS NOTES
[address] [facility]   Cardiovascular Evaluation    PATIENT: Katelyn Burrell  DATE: 2022  MRN: [id number]  CSN: [id number]  : 1970    Primary Care Doctor/Referring provider: Isabella Addison MD, Adalgisa Ocasio MD     Reason for evaluation/Chief complaint:   Hyperglycemia (Pt to ED with c/o hyperglycemia, glucose in 400s at home. Pt not diagnosed diabetic but used dads meter and sugar read in 400s. Pt reports increased urination, feeling thirsty, generalized weakness pt reports for years.)      Subjective: No issues overnight. Patient was also seen by our electrophysiology service. History of present illness on initial date of evaluation:   Katelyn Burrell is a 46 y.o. patient who presents to the hospital for evaluation of feeling poorly including weakness, increased urination and thirstiness. Patient was diagnosed with hyperglycemia and separately admitted to the hospital.  Patient has previously not been diagnosed with diabetes or cardiovascular illness. As part of his work-up here in the hospital he underwent an EKG. His EKG demonstrated a left bundle branch block. Patient was subsequently referred for stress testing. Patient had a stress test this morning which demonstrates severely reduced left ventricular function with concerns of transient ischemic dilatation along with defect within the inferolateral wall. Interventional cardiology is asked see the patient for further recommendations and management. Patient somewhat of a poor historian cannot give detailed information regarding history of present illness. He does state that he has noted exercise intolerance. 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 He is noted that he cannot complete what he used to be able to do. .        Patient Active Problem List   Diagnosis    New onset type 2 diabetes mellitus ([facility] [address]    Acute hyperglycemia    LBBB (left bundle branch block)    Elevated BP without diagnosis of hypertension    Peripheral neuropathy    Benign essential HTN    Hyponatremia    Hypokalemia    Abnormal cardiovascular stress test         Cardiac Testing: I have reviewed the findings below. EKG:  ECHO:   STRESS TEST:  CATH:  BYPASS:  VASCULAR:    Past Medical History:   has a past medical history of GERD (gastroesophageal reflux disease) and WPW (Dorothy-Parkinson-White syndrome). Surgical History:   has a past surgical history that includes hernia repair. Social History:   reports that he has never smoked. He has never used smokeless tobacco. He reports that he does not drink alcohol and does not use drugs. Family History:  No evidence for sudden cardiac death or premature CAD    Medications:  Reviewed and are listed in nursing record. and/or listed below  Outpatient Medications:  Prior to Admission medications    Medication Sig Start Date End Date Taking? Authorizing Provider   Cholecalciferol (VITAMIN D3) 5000 UNITS TABS Take by mouth  Patient not taking: Reported on 9/24/2022    Historical MD Dalton   ibuprofen (ADVIL;MOTRIN) 800 MG tablet Take 1 tablet by mouth every 8 hours as needed for Pain or Fever 5/16/17   FAINA Khanna CNP   lidocaine (LIDODERM) 5 % Place 1 patch onto the skin daily 12 hours on, 12 hours off. 5/16/17   FAINA Khanna - CNP   Ascorbic Acid (VITAMIN C) 500 MG tablet Take 500 mg by mouth daily. Patient not taking: Reported on 9/24/2022    Historical Provider, MD   vitamin E 1000 UNIT capsule Take 1,000 Units by mouth daily. Patient not taking: Reported on 9/24/2022    Historical MD Dalton   [facility] by Does not apply route.     Patient not taking: Reported on 9/24/2022    Historical Provider, MD       In-patient schedule medications:   atorvastatin  40 mg Oral Nightly    insulin glargine  15 Units SubCUTAneous Nightly    insulin lispro  0-8 Units SubCUTAneous TID WC    insulin lispro  0-4 Units SubCUTAneous Nightly    insulin lispro 0.05 Units/kg SubCUTAneous TID     sodium chloride flush  10 mL IntraVENous 2 times per day    enoxaparin  40 mg SubCUTAneous Daily    lisinopril  5 mg Oral Daily    aspirin  81 mg Oral Daily         Infusion Medications:   dextrose      sodium chloride           Allergies:  Penicillins     Review of Systems:   All 14 point review of symptoms completed. Pertinent positives identified in the HPI, all other review of symptoms findings as below.      Review of Systems - History obtained from the patient  General ROS: negative for - chills, fever or night sweats  Psychological ROS: negative for - disorientation or hallucinations  Ophthalmic ROS: negative for - dry eyes, eye pain or loss of vision  ENT ROS: negative for - nasal discharge or sore throat  Allergy and Immunology ROS: negative for - hives or itchy/watery eyes  Hematological and Lymphatic ROS: negative for - jaundice or night sweats  Endocrine ROS: negative for - mood swings or temperature intolerance  Breast ROS: deferred  Respiratory ROS: negative for - hemoptysis or stridor  Gastrointestinal ROS: no abdominal pain, change in bowel habits, or black or bloody stools  Genito-Urinary ROS: no dysuria, trouble voiding, or hematuria  Musculoskeletal ROS: negative for - gait disturbance, joint pain or joint stiffness  Neurological ROS: negative for - seizures or speech problems  Dermatological ROS: negative for - rash or skin lesion changes      Physical Examination:    [unfilled]  /82   Pulse 58   Temp 97.4 °F (36.3 °C) (Oral)   Resp 18   Ht 6' 1\" (1.854 m)   Wt 155 lb 8 oz (70.5 kg)   SpO2 97%   BMI 20.52 kg/m²    Weight: 155 lb 8 oz (70.5 kg)     Wt Readings from Last 3 Encounters:   09/27/22 155 lb 8 oz (70.5 kg)   05/16/17 189 lb (85.7 kg)     No intake or output data in the 24 hours ending 09/27/22 0957      General Appearance:  Alert, cooperative, no distress, appears stated age   Head:  Normocephalic, without obvious abnormality, atraumatic Eyes:  PERRL, conjunctiva/corneas clear       Nose: Nares normal, no drainage or sinus tenderness   Throat: Lips, mucosa, and tongue normal   Neck: Supple, symmetrical, trachea midline, no adenopathy, thyroid: not enlarged, symmetric, no tenderness/mass/nodules, no carotid bruit or JVD       Lungs:   Clear to auscultation bilaterally, respirations unlabored   Chest Wall:  No tenderness or deformity   Heart:  Regular rhythm and normal rate; S1, S2 are normal; no murmur noted; no rub or gallop   Abdomen:   Soft, non-tender, bowel sounds active all four quadrants,  no masses, no organomegaly           Extremities: Extremities normal, atraumatic, no cyanosis or edema   Pulses: 2+ and symmetric   Skin: Skin color, texture, turgor normal, no rashes or lesions   Pysch: Normal mood and affect   Neurologic: Normal gross motor and sensory exam.         Labs  Recent Labs     09/24/22  1553 09/25/22  0424   WBC 7.0 7.1   HGB 16.0 13.6   HCT 47.6 39.9*   MCV 91.0 90.5    264       Recent Labs     09/26/22  0550 09/27/22  0648   CREATININE 0.8* 0.8*   BUN 15 21*    135*   K 4.2 3.8    101   CO2 27 23       No results for input(s): INR, PROTIME in the last 72 hours. Recent Labs     09/26/22  1613   TROPONINI <0.01     Invalid input(s): PRO-BNP  Recent Labs     09/26/22  0550   CHOL 206*   HDL 40           Imaging:  I have reviewed the below testing personally and my interpretation is below. EKG:  CXR:      Assessment:  46 y.o. patient with:      LBBB (left bundle branch block)    Abnormal cardiovascular stress test    Plan:  1. I had the opportunity to review the Katelyn Burrell clinical presentation and the available pertinent data. With the patient's clinical risk factors and symptoms, there is a high pretest likelihood for CAD. I have asked Katelyn Burrell to undergo cardiac angiography for further diagnostic testing. The procedure was explained in depth.  All questions and alternate treatment

## 2022-09-27 NOTE — POST SEDATION
Patient:  Wily Arteaga   :   1970    A pre-sedation re-evaluation was performed immediately at the end of the procedure. Procedure:  Cardiac cath  Medications: Procedural sedation with minimal conscious sedation  Complications: None  Estimated Blood Loss: none  Specimens: Were not obtained        Brandon Medication and Procedural Reconciliation:  I agree that the documented medications and procedures performed are true. The medications were given under my order. The procedures were performed under my direct supervision.

## 2022-09-27 NOTE — PROCEDURES
[address]       Cardiac Catheterization Lab Report    PATIENT: Shubham Valle  DATE: 2022  MRN: [id number]  CSN: [id number]  : 1970      Performing Physician: Judy Cunningham MD, ADRIAN, [facility] [place]  Primary Care Physician: Dominic Ashford MD  Admitting Provider: Lindy Chambers MD    Procedures Performed:   1. Left heart catheterization  2. Selective left and right coronary angiogram  3. Left ventriculography     Procedure Findings:  1. Normal coronary angiogram  2. Reduced left ventricular function with EF estimated at 40%  3. Normal left heart hemodynamics    Indications:   Patient Active Problem List   Diagnosis    New onset type 2 diabetes mellitus ([facility] [address]    Acute hyperglycemia    LBBB (left bundle branch block)    Elevated BP without diagnosis of hypertension    Peripheral neuropathy    Benign essential HTN    Hyponatremia    Hypokalemia    Abnormal cardiovascular stress test       Details:   Shubham Valle was brought to the cardiac catheterization lab in a fasting state after informed consent was obtained. If the patient was able to provide written consent, it was obtained. The patient's vitals were monitored through out the procedure. The patient was sedated using the appropriate levels of sedation and ASA guidelines. The appropriate access site area was prepped and drapped in a sterile fashion. The area was anesthetized with 2% lidocaine. Using the modified Seldinger technique, an arterial sheath was introduced into the arterial access site using a single anterior wall puncture. The sheath was flushed and prepped in usual fashion. Catheters used during the procedure included 5 Georgian TIG 4.0 catheter. The catheters were advanced and removed over a .035\" wire, into the appropriate positions. Multiple angiographic views were obtained. An LV gram was obtained. Findings:    1.  Left main coronary artery was normal. It gave off the left anterior descending artery and left circumflex. 2. Left anterior descending artery was normal. It was moderate in size. It gave off septal perforators and a moderate sized diagonal branch. The LAD covered the entire apex of the left ventricle. 3. Left circumflex was normal. It was moderate in size. There was a moderate sized obtuse marginal branch. 4. Right coronary artery was normal. It was moderate in size and was the dominant artery. 5. Left ventriculogram showed LVEF at 40%. Wall motion was globally hypokinetic. Jim[place] Colon There was no significant mitral valve or aortic valve disease noted. LVEDP was normal. There was no gradient noted across the aortic valve during pullback of the catheter. /82   Pulse 58   Temp 97.4 °F (36.3 °C) (Oral)   Resp 18   Ht 6' 1\" (1.854 m)   Wt 155 lb 8 oz (70.5 kg)   SpO2 97%   BMI 20.52 kg/m²     The access site was controlled with manual pressure and/or appropriate closure device. Moderate Conscious Sedation Details: An independent trained observer pushed medications at my direction. We monitoring the patient's level of consciousness and vital signs/physiologic status throughout the procedure. CPT codes 13883 and 14949      Start time: 1022  Stop time: 1035  ASA class: 3    Sedation totals:  Versad - 3mg  Fentanyl - 50mcg    EBL - minimal <5 cc blood loss    The patient was monitored continuously with the ECG, pulse oximetry, blood pressure, and direct observation. CONCLUSIONS:    1.  Non-ischemic cardiomyopathy  2. False positive stress test      ASSESSMENT/RECOMMENDATIONS:    1. Advanced heart failure management  2. Electrophysiology management.       Lolly Coronado MD, ADRIAN, [place] [address] Cardiology  [facility]  [phone] Main central office  [phone] Formerly [facility] office  9/27/2022  10:43 AM

## 2022-09-27 NOTE — PROGRESS NOTES
[facility]     Electrophysiology                                     Progress Note    Admission date:  2022    Reason for follow up visit: WPW    HPI/CC: Yasmeen Leigh was admitted on 2022 with hyperglycemia. EP consulted for WPW and new LBBB. On 2022 stress test showed large, severe, fixed, basal to mid inferior/ inferoseptal perfusion defect with associated hypokinesis. On 2022 LHC showed normal coronaries. Echo showed an EF of 35-40% with hypokinesis of the basal inferior, inferior, and inferolateral walls. Rhythm has been SR with intermittent pre-excitation. Subjective: He feels okay following his LHC. He denies current chest pain, palpitations, shortness of breath, and dizziness. Vitals:  Blood pressure 113/72, pulse 82, temperature 98.4 °F (36.9 °C), temperature source Oral, resp. rate 18, height 6' 1\" (1.854 m), weight 155 lb 8 oz (70.5 kg), SpO2 97 %.   Temp  Av.1 °F (36.7 °C)  Min: 97.4 °F (36.3 °C)  Max: 99 °F (37.2 °C)  Pulse  Av  Min: 58  Max: 86  BP  Min: 102/61  Max: 130/80  SpO2  Av.5 %  Min: 96 %  Max: 99 %    24 hour I/O  No intake or output data in the 24 hours ending 22 1837  Current Facility-Administered Medications   Medication Dose Route Frequency Provider Last Rate Last Admin    sodium chloride flush 0.9 % injection 5-40 mL  5-40 mL IntraVENous 2 times per day Aleks Garcia MD        sodium chloride flush 0.9 % injection 5-40 mL  5-40 mL IntraVENous PRN Aleks Garcia MD        0.9 % sodium chloride infusion   IntraVENous PRN Aleks Garcia MD        hydrALAZINE (APRESOLINE) injection 10 mg  10 mg IntraVENous Q10 Min PRN Aleks Garcia MD        metoprolol succinate (TOPROL XL) extended release tablet 12.5 mg  12.5 mg Oral Daily FAINA Courtney CNP        atorvastatin (LIPITOR) tablet 40 mg  40 mg Oral Nightly Aleks Garcia MD   40 mg at 22    glucose chewable tablet 16 g  4 tablet Oral PRN Muriel Hodgkin, MD        dextrose bolus 10% 125 mL  125 mL IntraVENous PRN Muriel Hodgkin, MD        Or    dextrose bolus 10% 250 mL  250 mL IntraVENous PRN Muriel Hodgkin, MD        glucagon (rDNA) injection 1 mg  1 mg SubCUTAneous PRN Muriel Hodgkin, MD        dextrose 10 % infusion   IntraVENous Continuous PRN Muriel Hodgkin, MD        insulin glargine (LANTUS) injection vial 15 Units  15 Units SubCUTAneous Nightly Muriel Hodgkin, MD   15 Units at 09/26/22 2112    insulin lispro (HUMALOG) injection vial 0-8 Units  0-8 Units SubCUTAneous TID  Muriel Hodgkin, MD   6 Units at 09/27/22 1627    insulin lispro (HUMALOG) injection vial 0-4 Units  0-4 Units SubCUTAneous Nightly Muriel Hodgkin, MD        insulin lispro (HUMALOG) injection vial 4 Units  0.05 Units/kg SubCUTAneous TID WC Muriel Hodgkin, MD   4 Units at 09/27/22 1629    sodium chloride flush 0.9 % injection 10 mL  10 mL IntraVENous 2 times per day Muriel Hodgkin, MD   10 mL at 09/27/22 0831    sodium chloride flush 0.9 % injection 10 mL  10 mL IntraVENous PRN Muriel Hodgkin, MD        0.9 % sodium chloride infusion   IntraVENous PRN Muriel Hodgkin, MD        potassium chloride (KLOR-CON M) extended release tablet 40 mEq  40 mEq Oral PRN Muriel Hodgkin, MD        Or    potassium bicarb-citric acid (EFFER-K) effervescent tablet 40 mEq  40 mEq Oral PRN Muriel Hodgkin, MD        Or    potassium chloride 10 mEq/100 mL IVPB (Peripheral Line)  10 mEq IntraVENous PRN Muriel Hodgkin, MD        magnesium sulfate 2000 mg in 50 mL IVPB premix  2,000 mg IntraVENous PRN Muriel Hodgkin, MD        enoxaparin (LOVENOX) injection 40 mg  40 mg SubCUTAneous Daily Muriel Hodgkin, MD   40 mg at 09/27/22 0828    promethazine (PHENERGAN) tablet 12.5 mg  12.5 mg Oral Q6H PRN Muriel Hodgkin, MD        Or    ondansetron [facility]) injection 4 mg  4 mg IntraVENous Q6H PRN Muriel Hodgkin, MD        senna (SENOKOT) tablet 8.6 mg  1 tablet Oral Daily PRN Larene Lefort, MD        acetaminophen (TYLENOL) tablet 650 mg  650 mg Oral Q6H PRN Larene Lefort, MD   650 mg at 09/25/22 2042    Or    acetaminophen (TYLENOL) suppository 650 mg  650 mg Rectal Q6H PRN Larene Lefort, MD        perflutren lipid microspheres (DEFINITY) injection 1.65 mg  1.5 mL IntraVENous ONCE PRN Larene Lefort, MD        lisinopril (PRINIVIL;ZESTRIL) tablet 5 mg  5 mg Oral Daily Larene Lefort, MD   5 mg at 09/27/22 1814    labetalol (NORMODYNE;TRANDATE) injection 5 mg  5 mg IntraVENous Q4H PRN Larene Lefort, MD        aspirin EC tablet 81 mg  81 mg Oral Daily Larene Lefort, MD   81 mg at 09/27/22 4413       Objective:     Telemetry monitor: SR with intermittent pre-excitation    Physical Exam:  Constitutional and general appearance: alert, cooperative, no distress, and appears stated age  HEENT: PERRL, no cervical lymphadenopathy. No masses palpable. Normal oral mucosa  Respiratory:  Normal excursion and expansion without use of accessory muscles  Resp auscultation: Normal breath sounds without wheezing, rhonchi, and rales  Cardiovascular: The apical impulse is not displaced  Heart tones are crisp and normal. regular S1 and S2.  Jugular venous pulsation Normal  The carotid upstroke is normal in amplitude and contour without delay or bruit  Peripheral pulses are symmetrical and full   Abdomen:  No masses or tenderness  Bowel sounds present  Extremities:   No cyanosis or clubbing   No lower extremity edema   Skin: warm and dry  Neurological:  Alert and oriented  Moves all extremities well  No abnormalities of mood, affect, memory, mentation, or behavior are noted    Data  [facility] 9/27/2022:  Findings:     1. Left main coronary artery was normal. It gave off the left anterior descending artery and left circumflex. 2. Left anterior descending artery was normal. It was moderate in size. It gave off septal perforators and a moderate sized diagonal branch.  The LAD covered the entire apex of the left ventricle. 3. Left circumflex was normal. It was moderate in size. There was a moderate sized obtuse marginal branch. 4. Right coronary artery was normal. It was moderate in size and was the dominant artery. 5. Left ventriculogram showed LVEF at 40%. Wall motion was globally hypokinetic. Pink Hans There was no significant mitral valve or aortic valve disease noted. LVEDP was normal. There was no gradient noted across the aortic valve during pullback of the catheter. /82   Pulse 58   Temp 97.4 °F (36.3 °C) (Oral)   Resp 18   Ht 6' 1\" (1.854 m)   Wt 155 lb 8 oz (70.5 kg)   SpO2 97%   BMI 20.52 kg/m²      The access site was controlled with manual pressure and/or appropriate closure device. Moderate Conscious Sedation Details: An independent trained observer pushed medications at my direction. We monitoring the patient's level of consciousness and vital signs/physiologic status throughout the procedure. CPT codes 16837 and 19432        Start time: 1022  Stop time: 1035  ASA class: 3     Sedation totals:  Versad - 3mg  Fentanyl - 50mcg     EBL - minimal <5 cc blood loss     The patient was monitored continuously with the ECG, pulse oximetry, blood pressure, and direct observation. CONCLUSIONS:     1.  Non-ischemic cardiomyopathy  2. False positive stress test        ASSESSMENT/RECOMMENDATIONS:     1. Advanced heart failure management  2. Electrophysiology management. Echo 9/27/2022:   Conclusions      Summary   The left ventricular systolic function is mildly reduced with an ejection   fraction of 35-40 %. There is hypokinesis of the basal inferior, inferior and inferolateral   walls. Normal left ventricular size with moderate concentric left ventricular   hypertrophy. Grade I diastolic dysfunction with normal filling pressure. Mild mitral regurgitation.      Stress test 9/26/2022:  Conclusions        Summary    Large, severe, fixed, basal to mid inferior/inferoseptal perfusion defect    with associated hypokinesis. The left ventricle appears enlarged with    moderately decreased LV function. Post-stress LVEF is 36%. Visual TID is    present. Onset of wide complex rhythm with lexiscan - suspected    rate-dependent LBBB. Abnormal study. Overall findings represent a high risk scan. Findings    suggest prior infarction of the RCA territory. The presence of transient    ischemic dilation suggests possible multi-vessel disease. All labs and testing reviewed.   Lab Review     Renal Profile:   Lab Results   Component Value Date/Time    CREATININE 0.8 09/27/2022 06:48 AM    BUN 21 09/27/2022 06:48 AM     09/27/2022 06:48 AM    K 3.8 09/27/2022 06:48 AM     09/27/2022 06:48 AM    CO2 23 09/27/2022 06:48 AM     CBC:    Lab Results   Component Value Date/Time    WBC 7.1 09/25/2022 04:24 AM    RBC 4.40 09/25/2022 04:24 AM    HGB 13.6 09/25/2022 04:24 AM    HCT 39.9 09/25/2022 04:24 AM    MCV 90.5 09/25/2022 04:24 AM    RDW 12.8 09/25/2022 04:24 AM     09/25/2022 04:24 AM     BNP:  No results found for: BNP  Fasting Lipid Panel:    Lab Results   Component Value Date/Time    CHOL 206 09/26/2022 05:50 AM    HDL 40 09/26/2022 05:50 AM    HDL 22 03/04/2010 08:41 AM    TRIG 199 09/26/2022 05:50 AM     Cardiac Enzymes:  CK/MbTroponin  Lab Results   Component Value Date/Time    CKMB 0.49 03/09/2010 07:40 AM    TROPONINI <0.01 09/26/2022 04:13 PM     PT/ INR   Lab Results   Component Value Date/Time    INR 1.27 03/09/2010 07:40 AM    PROTIME 14.5 03/09/2010 07:40 AM     PTT No results found for: PTT   Lab Results   Component Value Date/Time    MG 2.00 09/25/2022 04:24 AM    No results found for: TSH    Assessment:  WPW: ongoing    -intermittent preexcitation  LBBB: stable   -normal coronaries on [facility] 9/27/2022  Nonischemic cardiomyopathy: ongoing   -EF 35-40% on echo 9/27/2022  False positive stress test 9/26/2022  HTN: controlled  DM    Plan: 1. Start Toprol 12.5 mg tonight for GDMT of cardiomyopathy   2. Continue lisinopril, aspirin and atorvastatin   3. Continue to monitor on telemetry while admitted  4.  Will likely be stable for discharge tomorrow       [address]  [phone]

## 2022-09-27 NOTE — PROGRESS NOTES
Nutrition Education  RD provided pt w/ DM diet education. Pt reports consuming a diet high in processed sugars from ice cream, white bread, fries and popsicles. Reports consuming 5 ice cream sandwiches nightly. Pt reports he was feeling pretty drained PTA and thought eating more sugar would help. RD provided pt education on consistent carbohydrate diet and encouraged pt to limit processed/ high sugar foods. Encouraged pt to consume 4-5 servings of carbohydrates w/ meals. Educated patient on serving sizes of carbohydrates from each food group. Educated pt on carbohydrates in milk products, encouraged pt to try unsweetened dairy free milks or skim milks. Pt seems motivated to make changes to improve his health and energy. Encouraged pt to consume more whole grains, non starchy vegetables, fruits and lean protein to help improve energy levels and longevity of health. Pt has not had any education in the past and would benefit from an outpatient dietitian. RD provided pt w/ a list of diabetes educators in the area. Will continue to monitor. Educated on DM diet education  Learners: Patient  Readiness: Acceptance  Method: Explanation and Handout  Response: Verbalizes Understanding and Needs Reinforcement  Contact name and number provided.     Mariusz Preciado, MS, RD, LD  Contact Number: Office: [phone]; [address]: [id number]

## 2022-09-28 VITALS
OXYGEN SATURATION: 97 % | WEIGHT: 157.9 LBS | HEART RATE: 66 BPM | HEIGHT: 73 IN | TEMPERATURE: 98.3 F | SYSTOLIC BLOOD PRESSURE: 120 MMHG | BODY MASS INDEX: 20.93 KG/M2 | RESPIRATION RATE: 18 BRPM | DIASTOLIC BLOOD PRESSURE: 79 MMHG

## 2022-09-28 PROBLEM — I42.8 NON-ISCHEMIC CARDIOMYOPATHY (HCC): Status: ACTIVE | Noted: 2022-09-28

## 2022-09-28 LAB
ANION GAP SERPL CALCULATED.3IONS-SCNC: 11 MMOL/L (ref 3–16)
BUN BLDV-MCNC: 14 MG/DL (ref 7–20)
CALCIUM SERPL-MCNC: 9.1 MG/DL (ref 8.3–10.6)
CHLORIDE BLD-SCNC: 103 MMOL/L (ref 99–110)
CO2: 24 MMOL/L (ref 21–32)
CREAT SERPL-MCNC: 0.7 MG/DL (ref 0.9–1.3)
GFR AFRICAN AMERICAN: >60
GFR NON-AFRICAN AMERICAN: >60
GLUCOSE BLD-MCNC: 154 MG/DL (ref 70–99)
GLUCOSE BLD-MCNC: 176 MG/DL (ref 70–99)
GLUCOSE BLD-MCNC: 179 MG/DL (ref 70–99)
MAGNESIUM: 1.9 MG/DL (ref 1.8–2.4)
PERFORMED ON: ABNORMAL
PERFORMED ON: ABNORMAL
POTASSIUM SERPL-SCNC: 3.8 MMOL/L (ref 3.5–5.1)
SODIUM BLD-SCNC: 138 MMOL/L (ref 136–145)

## 2022-09-28 PROCEDURE — 80048 BASIC METABOLIC PNL TOTAL CA: CPT

## 2022-09-28 PROCEDURE — 1200000000 HC SEMI PRIVATE

## 2022-09-28 PROCEDURE — 83735 ASSAY OF MAGNESIUM: CPT

## 2022-09-28 PROCEDURE — 2580000003 HC RX 258: Performed by: INTERNAL MEDICINE

## 2022-09-28 PROCEDURE — 99232 SBSQ HOSP IP/OBS MODERATE 35: CPT

## 2022-09-28 PROCEDURE — 36415 COLL VENOUS BLD VENIPUNCTURE: CPT

## 2022-09-28 PROCEDURE — 96372 THER/PROPH/DIAG INJ SC/IM: CPT

## 2022-09-28 PROCEDURE — 6360000002 HC RX W HCPCS: Performed by: INTERNAL MEDICINE

## 2022-09-28 PROCEDURE — G0378 HOSPITAL OBSERVATION PER HR: HCPCS

## 2022-09-28 PROCEDURE — 6370000000 HC RX 637 (ALT 250 FOR IP): Performed by: INTERNAL MEDICINE

## 2022-09-28 PROCEDURE — 6370000000 HC RX 637 (ALT 250 FOR IP)

## 2022-09-28 PROCEDURE — 99233 SBSQ HOSP IP/OBS HIGH 50: CPT | Performed by: NURSE PRACTITIONER

## 2022-09-28 RX ORDER — METFORMIN HYDROCHLORIDE 500 MG/1
500 TABLET, EXTENDED RELEASE ORAL
Qty: 30 TABLET | Refills: 1 | Status: SHIPPED | OUTPATIENT
Start: 2022-09-28

## 2022-09-28 RX ORDER — LISINOPRIL 5 MG/1
5 TABLET ORAL DAILY
Qty: 30 TABLET | Refills: 3 | Status: SHIPPED | OUTPATIENT
Start: 2022-09-29

## 2022-09-28 RX ORDER — ASPIRIN 81 MG/1
81 TABLET ORAL DAILY
Qty: 30 TABLET | Refills: 3 | Status: SHIPPED | OUTPATIENT
Start: 2022-09-29

## 2022-09-28 RX ORDER — METOPROLOL SUCCINATE 25 MG/1
25 TABLET, EXTENDED RELEASE ORAL DAILY
Qty: 30 TABLET | Refills: 3 | Status: SHIPPED | OUTPATIENT
Start: 2022-09-29

## 2022-09-28 RX ORDER — GLUCOSAMINE HCL/CHONDROITIN SU 500-400 MG
CAPSULE ORAL
Qty: 124 STRIP | Refills: 0 | Status: SHIPPED | OUTPATIENT
Start: 2022-09-28

## 2022-09-28 RX ORDER — ATORVASTATIN CALCIUM 40 MG/1
40 TABLET, FILM COATED ORAL NIGHTLY
Qty: 30 TABLET | Refills: 3 | Status: SHIPPED | OUTPATIENT
Start: 2022-09-28

## 2022-09-28 RX ORDER — METOPROLOL SUCCINATE 25 MG/1
25 TABLET, EXTENDED RELEASE ORAL DAILY
Status: DISCONTINUED | OUTPATIENT
Start: 2022-09-29 | End: 2022-09-28 | Stop reason: HOSPADM

## 2022-09-28 RX ORDER — INSULIN GLARGINE 100 [IU]/ML
15 INJECTION, SOLUTION SUBCUTANEOUS NIGHTLY
Qty: 5 ADJUSTABLE DOSE PRE-FILLED PEN SYRINGE | Refills: 0 | Status: SHIPPED | OUTPATIENT
Start: 2022-09-28

## 2022-09-28 RX ADMIN — INSULIN LISPRO 4 UNITS: 100 INJECTION, SOLUTION INTRAVENOUS; SUBCUTANEOUS at 11:51

## 2022-09-28 RX ADMIN — ASPIRIN 81 MG: 81 TABLET, COATED ORAL at 08:08

## 2022-09-28 RX ADMIN — ENOXAPARIN SODIUM 40 MG: 100 INJECTION SUBCUTANEOUS at 08:08

## 2022-09-28 RX ADMIN — Medication 10 ML: at 08:11

## 2022-09-28 RX ADMIN — SODIUM CHLORIDE, PRESERVATIVE FREE 10 ML: 5 INJECTION INTRAVENOUS at 08:08

## 2022-09-28 RX ADMIN — METOPROLOL SUCCINATE 12.5 MG: 25 TABLET, FILM COATED, EXTENDED RELEASE ORAL at 08:10

## 2022-09-28 RX ADMIN — INSULIN LISPRO 4 UNITS: 100 INJECTION, SOLUTION INTRAVENOUS; SUBCUTANEOUS at 08:09

## 2022-09-28 NOTE — PROGRESS NOTES
[address]     Electrophysiology                                     Progress Note    Admission date:  2022    Reason for follow up visit: WPW    HPI/CC: Perfecto Kumar was admitted on 2022 with hyperglycemia. EP consulted for WPW and new LBBB. On 2022 stress test showed large, severe, fixed, basal to mid inferior/ inferoseptal perfusion defect with associated hypokinesis. On 2022 LHC showed normal coronaries. Echo showed an EF of 35-40% with hypokinesis of the basal inferior, inferior, and inferolateral walls. Rhythm has been SR with intermittent pre-excitation. Subjective: He feels okay today. He feels overwhelmed by his heart concerns. He denies chest pain, palpitations, shortness of breath, and dizziness. Vitals:  Blood pressure 120/79, pulse 66, temperature 98.3 °F (36.8 °C), temperature source Oral, resp. rate 18, height 6' 1\" (1.854 m), weight 157 lb 14.4 oz (71.6 kg), SpO2 97 %.   Temp  Av.1 °F (36.7 °C)  Min: 97.6 °F (36.4 °C)  Max: 98.4 °F (36.9 °C)  Pulse  Av.8  Min: 54  Max: 82  BP  Min: 108/76  Max: 130/80  SpO2  Av.2 %  Min: 94 %  Max: 99 %    24 hour I/O  No intake or output data in the 24 hours ending 22 1226  Current Facility-Administered Medications   Medication Dose Route Frequency Provider Last Rate Last Admin    [START ON 2022] metoprolol succinate (TOPROL XL) extended release tablet 25 mg  25 mg Oral Daily Leanne Raffel, APRN - CNP        sodium chloride flush 0.9 % injection 5-40 mL  5-40 mL IntraVENous 2 times per day Yoan Pearl MD   10 mL at 22 0811    sodium chloride flush 0.9 % injection 5-40 mL  5-40 mL IntraVENous PRN Yoan Pearl MD        0.9 % sodium chloride infusion   IntraVENous PRN Yoan Pearl MD        hydrALAZINE (APRESOLINE) injection 10 mg  10 mg IntraVENous Q10 Min PRN Yoan Pearl MD        atorvastatin (LIPITOR) tablet 40 mg  40 mg Oral Nightly Yoan Pearl MD   40 mg at 09/27/22 2004    glucose chewable tablet 16 g  4 tablet Oral PRN Muriel Hodgkin, MD        dextrose bolus 10% 125 mL  125 mL IntraVENous PRN Muriel Hodgkin, MD        Or    dextrose bolus 10% 250 mL  250 mL IntraVENous PRN Muriel Hodgkin, MD        glucagon (rDNA) injection 1 mg  1 mg SubCUTAneous PRN Muriel Hodgkin, MD        dextrose 10 % infusion   IntraVENous Continuous PRN Muriel Hodgkin, MD        insulin glargine (LANTUS) injection vial 15 Units  15 Units SubCUTAneous Nightly Muriel Hodgkin, MD   15 Units at 09/27/22 2004    insulin lispro (HUMALOG) injection vial 0-8 Units  0-8 Units SubCUTAneous TID WC Muriel Hodgkin, MD   6 Units at 09/27/22 1627    insulin lispro (HUMALOG) injection vial 0-4 Units  0-4 Units SubCUTAneous Nightly Muriel Hodgkin, MD        insulin lispro (HUMALOG) injection vial 4 Units  0.05 Units/kg SubCUTAneous TID WC Muriel Hodgkin, MD   4 Units at 09/28/22 1151    sodium chloride flush 0.9 % injection 10 mL  10 mL IntraVENous 2 times per day Muriel Hodgkin, MD   10 mL at 09/28/22 0808    sodium chloride flush 0.9 % injection 10 mL  10 mL IntraVENous PRN Muriel Hodgkin, MD        0.9 % sodium chloride infusion   IntraVENous PRN Muriel Hodgkin, MD        potassium chloride (KLOR-CON M) extended release tablet 40 mEq  40 mEq Oral PRN Muriel Hodgkin, MD        Or    potassium bicarb-citric acid (EFFER-K) effervescent tablet 40 mEq  40 mEq Oral PRN Muriel Hodgkin, MD        Or    potassium chloride 10 mEq/100 mL IVPB (Peripheral Line)  10 mEq IntraVENous PRN Muriel Hodgkin, MD        magnesium sulfate 2000 mg in 50 mL IVPB premix  2,000 mg IntraVENous PRN Muriel Hodgkin, MD        enoxaparin (LOVENOX) injection 40 mg  40 mg SubCUTAneous Daily Muriel Hodgkin, MD   40 mg at 09/28/22 0808    promethazine (PHENERGAN) tablet 12.5 mg  12.5 mg Oral Q6H PRN Muriel Hodgkin, MD        Or    ondansetron [facility]) injection 4 mg  4 mg IntraVENous Q6H PRN Roberta ARCHULETA Mildred Castañeda MD        [facility]) tablet 8.6 mg  1 tablet Oral Daily PRN Aleks Garcia MD        acetaminophen (TYLENOL) tablet 650 mg  650 mg Oral Q6H PRN Aleks Garcia MD   650 mg at 09/25/22 2042    Or    acetaminophen (TYLENOL) suppository 650 mg  650 mg Rectal Q6H PRN Aleks Garcia MD        perflutren lipid microspheres (DEFINITY) injection 1.65 mg  1.5 mL IntraVENous ONCE PRN Aleks Garcia MD        lisinopril (PRINIVIL;ZESTRIL) tablet 5 mg  5 mg Oral Daily Aleks Garcia MD   5 mg at 09/27/22 6715    labetalol (NORMODYNE;TRANDATE) injection 5 mg  5 mg IntraVENous Q4H PRN Aleks Garcia MD        aspirin EC tablet 81 mg  81 mg Oral Daily Aleks Garcia MD   81 mg at 09/28/22 0808       Objective:     Telemetry monitor: SR with intermittent pre-excitation    Physical Exam:  Constitutional and general appearance: alert, cooperative, no distress, and appears stated age  HEENT: PERRL, no cervical lymphadenopathy. No masses palpable. Normal oral mucosa  Respiratory:  Normal excursion and expansion without use of accessory muscles  Resp auscultation: Normal breath sounds without wheezing, rhonchi, and rales  Cardiovascular: The apical impulse is not displaced  Heart tones are crisp and normal. regular S1 and S2.  Jugular venous pulsation Normal  The carotid upstroke is normal in amplitude and contour without delay or bruit  Peripheral pulses are symmetrical and full   Abdomen:  No masses or tenderness  Bowel sounds present  Extremities:   No cyanosis or clubbing   No lower extremity edema   Skin: warm and dry  Neurological:  Alert and oriented  Moves all extremities well  No abnormalities of mood, affect, memory, mentation, or behavior are noted    Data  [facility] 9/27/2022:  Findings:     1. Left main coronary artery was normal. It gave off the left anterior descending artery and left circumflex. 2. Left anterior descending artery was normal. It was moderate in size.  It gave off septal perforators and a moderate sized diagonal branch. The LAD covered the entire apex of the left ventricle. 3. Left circumflex was normal. It was moderate in size. There was a moderate sized obtuse marginal branch. 4. Right coronary artery was normal. It was moderate in size and was the dominant artery. 5. Left ventriculogram showed LVEF at 40%. Wall motion was globally hypokinetic. Sriram Le There was no significant mitral valve or aortic valve disease noted. LVEDP was normal. There was no gradient noted across the aortic valve during pullback of the catheter. /82   Pulse 58   Temp 97.4 °F (36.3 °C) (Oral)   Resp 18   Ht 6' 1\" (1.854 m)   Wt 155 lb 8 oz (70.5 kg)   SpO2 97%   BMI 20.52 kg/m²      The access site was controlled with manual pressure and/or appropriate closure device. Moderate Conscious Sedation Details: An independent trained observer pushed medications at my direction. We monitoring the patient's level of consciousness and vital signs/physiologic status throughout the procedure. CPT codes 18354 and 84187        Start time: 1022  Stop time: 1035  ASA class: 3     Sedation totals:  Versad - 3mg  Fentanyl - 50mcg     EBL - minimal <5 cc blood loss     The patient was monitored continuously with the ECG, pulse oximetry, blood pressure, and direct observation. CONCLUSIONS:     1.  Non-ischemic cardiomyopathy  2. False positive stress test        ASSESSMENT/RECOMMENDATIONS:     1. Advanced heart failure management  2. Electrophysiology management. Echo 9/27/2022:   Conclusions      Summary   The left ventricular systolic function is mildly reduced with an ejection   fraction of 35-40 %. There is hypokinesis of the basal inferior, inferior and inferolateral   walls. Normal left ventricular size with moderate concentric left ventricular   hypertrophy. Grade I diastolic dysfunction with normal filling pressure. Mild mitral regurgitation.      Stress test 9/26/2022:  Conclusions        Summary    Large, severe, fixed, basal to mid inferior/inferoseptal perfusion defect    with associated hypokinesis. The left ventricle appears enlarged with    moderately decreased LV function. Post-stress LVEF is 36%. Visual TID is    present. Onset of wide complex rhythm with lexiscan - suspected    rate-dependent LBBB. Abnormal study. Overall findings represent a high risk scan. Findings    suggest prior infarction of the RCA territory. The presence of transient    ischemic dilation suggests possible multi-vessel disease. All labs and testing reviewed.   Lab Review     Renal Profile:   Lab Results   Component Value Date/Time    CREATININE 0.7 09/28/2022 04:51 AM    BUN 14 09/28/2022 04:51 AM     09/28/2022 04:51 AM    K 3.8 09/28/2022 04:51 AM    K 3.8 09/27/2022 06:48 AM     09/28/2022 04:51 AM    CO2 24 09/28/2022 04:51 AM     CBC:    Lab Results   Component Value Date/Time    WBC 7.1 09/25/2022 04:24 AM    RBC 4.40 09/25/2022 04:24 AM    HGB 13.6 09/25/2022 04:24 AM    HCT 39.9 09/25/2022 04:24 AM    MCV 90.5 09/25/2022 04:24 AM    RDW 12.8 09/25/2022 04:24 AM     09/25/2022 04:24 AM     BNP:  No results found for: BNP  Fasting Lipid Panel:    Lab Results   Component Value Date/Time    CHOL 206 09/26/2022 05:50 AM    HDL 40 09/26/2022 05:50 AM    HDL 22 03/04/2010 08:41 AM    TRIG 199 09/26/2022 05:50 AM     Cardiac Enzymes:  CK/MbTroponin  Lab Results   Component Value Date/Time    CKMB 0.49 03/09/2010 07:40 AM    TROPONINI <0.01 09/26/2022 04:13 PM     PT/ INR   Lab Results   Component Value Date/Time    INR 1.27 03/09/2010 07:40 AM    PROTIME 14.5 03/09/2010 07:40 AM     PTT No results found for: PTT   Lab Results   Component Value Date/Time    MG 1.90 09/28/2022 04:51 AM    No results found for: TSH    Assessment:  WPW: ongoing    -intermittent preexcitation  LBBB: stable   -normal coronaries on [facility] 9/27/2022  Nonischemic cardiomyopathy: ongoing   -EF 35-40% on echo 9/27/2022  False positive stress test 9/26/2022  HTN: controlled  DM    Plan:   1. Increase Toprol to 25 mg for GDMT of cardiomyopathy   2. Continue lisinopril, aspirin and atorvastatin   3.  Okay to discharge from an EP standpoint  4. EP office to arrange hospital follow up       Plan of care reviewed with Dr. Steve Liao, APRN-CNP  [facility]  [phone]

## 2022-09-28 NOTE — PLAN OF CARE
Problem: Discharge Planning  Goal: Discharge to home or other facility with appropriate resources  Outcome: Progressing     Problem: Chronic Conditions and Co-morbidities  Goal: Patient's chronic conditions and co-morbidity symptoms are monitored and maintained or improved  Outcome: Progressing  Goal: Knowledge of the need for serum glucose monitoring  Description: Knowledge of the need for serum glucose monitoring  Outcome: Progressing

## 2022-09-28 NOTE — CONSULTS
Nutrition Education    Consult received for CHF diet education. Provided pt with written and verbal instruction on HF nutrition therapy. Discussed low sodium diet, daily weights, and fluid restriction. Pt wanting to know more about how to eat to gain weight following hospital stay. Discussed lean protein options, healthy carbohydrate sources, and eating regular meals throughout the day. Pt voiced understanding. Time spent: 5 minutes    Educated on CHF nutrition therapy. Learners: Patient  Readiness: Acceptance  Method: Explanation and Handout  Response: Demonstrated Understanding  Contact name and number provided.     Nestor Rangel  Contact Number: Office: [phone]; [address]: [address]

## 2022-09-28 NOTE — DISCHARGE INSTRUCTIONS
FOLLOW-UP APPOINTMENTS    [place] OFFICE - Follow-up appointment on November 21st at 8:45am with Dr. Altamease Lennox, electrophysiologist, [facility]. You and your one visitor will need to have your mouth and nose covered  with a mask. No children please. [facility],  [facility], [address] [place] [address] [address], [address] [facility], [address] [place] [address]. Office #: [phone]. If you are unable to make this appointment, please call to reschedule. Directions to [place] [address] towards [place]. [address] [facility] exit. Right off exit. Cross over [employer]. Right on [address]. Left into hospital. Follow the signs to the emergency room ( turn left toward the Emergency room). Go right at the first stop sign. Just past the Emergency room at the second stop sign turn right and go up the ramp and park on the top level if possible. Go in the glass doors of the [facility] we on the top level of the garage [address]. As soon as you get in the door turn left and our office is the one with the glass doors.

## 2022-09-28 NOTE — CARE COORDINATION
9/28/22 IPTA, no needs anticipated, false positive stress, clean cath, CM signed off. Please notify should any needs arise.

## 2022-09-28 NOTE — PROGRESS NOTES
[facility]   Daily Progress Note    Admit Date:  9/24/2022  HPI:    Chief Complaint   Patient presents with    Hyperglycemia     Pt to ED with c/o hyperglycemia, glucose in 400s at home. Pt not diagnosed diabetic but used dads meter and sugar read in 400s. Pt reports increased urination, feeling thirsty, generalized weakness pt reports for years. Lis Coleman presented with weight loss and thirst, overall not feeling well. Found to have hyperglycemia with hyponatremia, newly diagnosed DM2. Hx of WPW. EKG with intermittent WPW and new LBBB. Stress test completed and abnormal (ischemia, LV dysfunction), echo with severe LV dysfunction, LHC no obstructive CAD. Subjective:  Mr. Kyle Guerin denies any chest pain or shortness of breath.  + lightheadedness with getting up.      Objective:   Patient Vitals for the past 24 hrs:   BP Temp Temp src Pulse Resp SpO2 Weight   09/28/22 1145 120/79 98.3 °F (36.8 °C) Oral 66 18 97 % --   09/28/22 0800 111/71 98.3 °F (36.8 °C) Oral 81 18 99 % --   09/28/22 6904 -- -- -- -- -- -- 157 lb 14.4 oz (71.6 kg)   09/27/22 2323 119/74 97.6 °F (36.4 °C) Oral 54 18 94 % --   09/27/22 1937 108/76 98 °F (36.7 °C) Oral 72 18 97 % --   09/27/22 1545 113/72 98.4 °F (36.9 °C) Oral 82 18 97 % --   09/27/22 1330 130/80 98 °F (36.7 °C) Oral 70 16 99 % --     No intake or output data in the 24 hours ending 09/28/22 1253  Wt Readings from Last 3 Encounters:   09/28/22 157 lb 14.4 oz (71.6 kg)   05/16/17 189 lb (85.7 kg)         ASSESSMENT:   Cardiomyopathy, non-ischemic: EF 35-40%, started on Toprol and lisinopril  HYPERTENSION: stable, lisinopril dose held this am  WPW: per EP  LBBB: per EP  DM2: new diagnosis: A1c > 13      PLAN:  Continue Toprol 25 mg daily and lisinopril 5 mg daily - split up to avoid hypotension  Continue aspirin and statin given risk factors and LV dysfunction  Reviewed no added salt diet in addition to his diabetic diet  Okay for discharge from cardiac perspective. Will review cardiac medications on discharge medication reconciliation form. Patient has follow up appointment with Lc Benson CNP in 2 weeks. FAINA Saenz CNP, 9/28/2022, 12:53 PM  [facility]   [phone]       Telemetry: SR, LBBB, 50-80  NYHA: II    Physical Exam:  General:  Awake, alert, NAD  Skin:  Warm and dry  Neck:  JVP normal  Chest:  Clear to auscultation   Cardiovascular:  RRR, normal S1S2, no m/g/r  Abdomen:  Soft, nontender, +bowel sounds  Extremities:  No BLE edema  right radial site without ooze, bruise or hematoma, dressing C,D,I, 2+ pulse      Medications:    [START ON 9/29/2022] metoprolol succinate  25 mg Oral Daily    sodium chloride flush  5-40 mL IntraVENous 2 times per day    atorvastatin  40 mg Oral Nightly    insulin glargine  15 Units SubCUTAneous Nightly    insulin lispro  0-8 Units SubCUTAneous TID WC    insulin lispro  0-4 Units SubCUTAneous Nightly    insulin lispro  0.05 Units/kg SubCUTAneous TID WC    sodium chloride flush  10 mL IntraVENous 2 times per day    enoxaparin  40 mg SubCUTAneous Daily    lisinopril  5 mg Oral Daily    aspirin  81 mg Oral Daily      sodium chloride      dextrose      sodium chloride         Lab Data: Lab results independently reviewed and analyzed by myself 9/28/2022    CBC: No results for input(s): WBC, HGB, PLT in the last 72 hours. BMP:    Recent Labs     09/26/22  0550 09/27/22  0648 09/28/22  0451    135* 138   K 4.2 3.8 3.8   CO2 27 23 24   BUN 15 21* 14   CREATININE 0.8* 0.8* 0.7*     INR:  No results for input(s): INR in the last 72 hours.   BNP:    Recent Labs     09/27/22  0648   PROBNP 49     Cardiac Enzymes:   Recent Labs     09/26/22  1613   TROPONINI <0.01     Lipids:   Lab Results   Component Value Date/Time    TRIG 199 09/26/2022 05:50 AM    TRIG 120 03/04/2010 08:41 AM    HDL 40 09/26/2022 05:50 AM    HDL 22 03/04/2010 08:41 AM    LDLCALC 126 09/26/2022 05:50 AM    LDLCALC 65 03/04/2010 TID is  present. Onset of wide complex rhythm with lexiscan - suspected  rate-dependent LBBB. Abnormal study. Overall findings represent a high risk scan. Findings  suggest prior infarction of the RCA territory. The presence of transient  ischemic dilation suggests possible multi-vessel disease.

## 2022-09-28 NOTE — PLAN OF CARE
Problem: Discharge Planning  Goal: Discharge to home or other facility with appropriate resources  9/28/2022 1022 by Kishore Katz RN  Outcome: Progressing  9/27/2022 2338 by Daljit Corado RN  Outcome: Progressing     Problem: Chronic Conditions and Co-morbidities  Goal: Patient's chronic conditions and co-morbidity symptoms are monitored and maintained or improved  9/28/2022 1022 by Kishore Katz RN  Outcome: Progressing  9/27/2022 2338 by Daljit Corado RN  Outcome: Progressing  Goal: Knowledge of the need for serum glucose monitoring  Description: Knowledge of the need for serum glucose monitoring  9/28/2022 1022 by Kishore Katz RN  Outcome: Progressing  9/27/2022 2338 by Daljit Corado RN  Outcome: Progressing

## 2022-09-28 NOTE — DISCHARGE SUMMARY
Hospital Medicine Discharge Summary    Patient ID: Lena Quiroz      Patient's PCP: Oksana Ashton MD    Admit Date: 9/24/2022     Discharge Date: 9/28/2022      Admitting Provider: Katy Shelton MD     Discharge Provider: FAINA Black CNP     Discharge Diagnoses: Active Hospital Problems    Diagnosis     Non-ischemic [facility] [I42.8]      Priority: Medium    Abnormal cardiovascular stress test [R94.39]      Priority: Medium    LBBB (left bundle branch block) [I44.7]      Priority: Medium    Elevated BP without diagnosis of hypertension [R03.0]      Priority: Medium    Peripheral neuropathy [G62.9]      Priority: Medium    Benign essential HTN [I10]      Priority: Medium    Hyponatremia [E87.1]      Priority: Medium    Hypokalemia [E87.6]      Priority: Medium    New onset type 2 diabetes mellitus ([facility] [E11.9]      Priority: Medium    Acute hyperglycemia [R73.9]      Priority: Medium       The patient was seen and examined on day of discharge and this discharge summary is in conjunction with any daily progress note from day of discharge. Hospital Course:     Lena Quiroz is a 46 y.o. male who presented to the ED to be evaluated for concerns of new onset diabetes. He reports that for the past several years he has experienced polyuria, polydipsia, polyphagia, and generalized weakness. His father, who is diabetic, allowed him to use his own glucometer; results revealed multiple glucose readings greater than 400. Upon further questioning patient also endorses blurred vision and symptoms of BLE neuropathy. He reports that his only known medical diagnoses prior to today include WPW and GERD. Upon arrival to the ED EKG was obtained revealing NSR with fusion complexes and PACs; new LBBB identified. CXR negative for acute cardiopulmonary findings. Notable labs include: Pseudohyponatremia 129, hyperglycemia 574, and lipase 68.   Patient received NS bolus per ED attending as well as 4 units of IV Humulin prior to request for admission. Newly diagnosed type II DM with acute hyperglycemia  -A1c 13.6 (plan to dc on metformin, Insulin-needs glucometer, strips and lancets)  -Patient on no hypoglycemic agents PTA  - Initiate weight-based Lantus insulin 15 units nightly  -Humalog scheduled AC/HS in addition to PRN SSI coverage  -Carbohydrate restriction placed on diet  -Consultation placed to Nutrition for ADA dietary education      Acute sHF, new LBBB  -Patient admitted to telemetry floor for continuous monitoring during stay  -EKG obtained in ED reviewed personally and notable for new LBBB  -Cardiology consulted-abnl stress, lg fixed defect, reduced EF 36%  -Recommend initiation of oral lisinopril in the setting of newly diagnosed DM  -IV labetalol scheduled PRN (with parameters) to address extreme BP elevation  -Serial cardiac enzymes will be trended overnight; BNP   -[facility] Procedure Findings:  1. Normal coronary angiogram  2. Reduced left ventricular function with EF estimated at 40%  3. Normal left heart hemodynamics     DC plan: Cont BB, Lisinopril, ASA/Statin-follow up with Cardiology/EP. Insulin/Metformin-follow up with PCP. Physical Exam Performed:     /79   Pulse 66   Temp 98.3 °F (36.8 °C) (Oral)   Resp 18   Ht 6' 1\" (1.854 m)   Wt 157 lb 14.4 oz (71.6 kg)   SpO2 97%   BMI 20.83 kg/m²       General appearance:  No apparent distress, appears stated age and cooperative. HEENT:  Normal cephalic, atraumatic without obvious deformity. Pupils equal, round, and reactive to light. Extra ocular muscles intact. Conjunctivae/corneas clear. Neck: Supple, with full range of motion. No jugular venous distention. Trachea midline. Respiratory:  Normal respiratory effort. Clear to auscultation, bilaterally without Rales/Wheezes/Rhonchi. Cardiovascular:  Regular rate and rhythm with normal S1/S2 without murmurs, rubs or gallops.   Abdomen: Soft, non-tender, non-distended with normal bowel sounds. Musculoskeletal:  No clubbing, cyanosis or edema bilaterally. Full range of motion without deformity. Skin: Skin color, texture, turgor normal.  No rashes or lesions. Neurologic:  Neurovascularly intact without any focal sensory/motor deficits. Cranial nerves: II-XII intact, grossly non-focal.  Psychiatric:  Alert and oriented, thought content appropriate, normal insight  Capillary Refill: Brisk,< 3 seconds   Peripheral Pulses: +2 palpable, equal bilaterally       Labs: For convenience and continuity at follow-up the following most recent labs are provided:      CBC:    Lab Results   Component Value Date/Time    WBC 7.1 09/25/2022 04:24 AM    HGB 13.6 09/25/2022 04:24 AM    HCT 39.9 09/25/2022 04:24 AM     09/25/2022 04:24 AM       Renal:    Lab Results   Component Value Date/Time     09/28/2022 04:51 AM    K 3.8 09/28/2022 04:51 AM    K 3.8 09/27/2022 06:48 AM     09/28/2022 04:51 AM    CO2 24 09/28/2022 04:51 AM    BUN 14 09/28/2022 04:51 AM    CREATININE 0.7 09/28/2022 04:51 AM    CALCIUM 9.1 09/28/2022 04:51 AM    PHOS 1.9 09/25/2022 11:53 AM         Significant Diagnostic Studies    Radiology:   NM MYOCARDIAL SPECT REST EXERCISE OR RX   Final Result      XR CHEST PORTABLE   Final Result   Stable chronic changes with no acute abnormality seen.                 Consults:     IP CONSULT TO HOSPITALIST  IP CONSULT TO CARDIOLOGY  IP CONSULT TO DIABETES EDUCATOR  IP CONSULT TO DIETITIAN  IP CONSULT TO HEART FAILURE NURSE/COORDINATOR  IP CONSULT TO DIETITIAN    Disposition:  Home    Condition at Discharge: Stable    Discharge Instructions/Follow-up:  See AVS    Code Status:  Prior     Activity: activity as tolerated    Diet: cardiac diet and diabetic diet      Discharge Medications:     Discharge Medication List as of 9/28/2022  2:00 PM             Details   aspirin 81 MG EC tablet Take 1 tablet by mouth daily, Disp-30 tablet, R-3Normal      insulin glargine (LANTUS SOLOSTAR) 100 UNIT/ML injection pen Inject 15 Units into the skin nightly, Disp-5 Adjustable Dose Pre-filled Pen Syringe, R-0Normal      atorvastatin (LIPITOR) 40 MG tablet Take 1 tablet by mouth nightly, Disp-30 tablet, R-3Normal      metoprolol succinate (TOPROL XL) 25 MG extended release tablet Take 1 tablet by mouth daily, Disp-30 tablet, R-3Normal      lisinopril (PRINIVIL;ZESTRIL) 5 MG tablet Take 1 tablet by mouth daily, Disp-30 tablet, R-3Normal      blood glucose monitor strips Test 4 times a day & as needed for symptoms of irregular blood glucose. Dispense sufficient amount for indicated testing frequency plus additional to accommodate PRN testing needs. , Disp-124 strip, R-0, Normal      metFORMIN (GLUCOPHAGE-XR) 500 MG extended release tablet Take 1 tablet by mouth daily (with breakfast), Disp-30 tablet, R-1Normal             Time Spent on discharge is more than 30 minutes in the examination, evaluation, counseling and review of medications and discharge plan. Signed:    FAINA Diaz - CNP   10/7/2022      Thank you Dominic Ashford MD for the opportunity to be involved in this patient's care. If you have any questions or concerns, please feel free to contact me at [phone].

## 2022-09-29 ENCOUNTER — FOLLOWUP TELEPHONE ENCOUNTER (OUTPATIENT)
Dept: CCU | Facility: HOSPITAL | Age: 52
End: 2022-09-29

## 2022-09-29 NOTE — TELEPHONE ENCOUNTER
[address] Trans[address] Initial Follow Up Call    Call within 2 business days of discharge: Yes     Patient: Denny Carpenter Patient : 1970 MRN: <>    [unfilled]    RARS: No data recorded     Spoke with: patient    Discharge department/facility: home    Non-face-to-face services provided:  Scheduled appointment with PCP-7days    Follow Up  Future Appointments   Date Time Provider Lorena Jansen   10/13/2022 11:00 AM Gabriella Long, APRN - CNP Jessica SHELTON   2022  8:45 AM MD Jessica Hensley RN

## 2022-10-11 NOTE — PROGRESS NOTES
[address]  Office Visit    Yousuf Nunn  1970 October 13, 2022    CC:   Chief Complaint   Patient presents with    Follow-Up from Hospital     HPI:  The patient is 46 y.o. male with a past medical history significant for WPW and GERD here for hospital follow up. He was hospitalized at [facility] from 9/24/2022-9/28/2022 after presenting with elevated BS, weight loss, and thirst. Found to have hyperglycemia with hyponatremia and newly dx with type II diabetes mellitus. ECG demonstrated intermittent WPW and new LBBB. Stress test was completed and demonstrated LV dysfunction and ischemia. Echo showed a marked decrease in LVEF. L heart cath was completed but unremarkable for obstructive CAD. He is to follow up with Cardiac EP (scheduled in November 2022 with Dr. Odette Larsen). Overall doing okay except he has noted some issues with his BS varying (has been as low as 41) after walking/running 1 mile. He states he is not following anyone for his diabetes mellitus. Monitoring sodium intake and fluid intake. Denies chest pain/discomfort, SOB, orthopnea/PND, cough, palpitations, dizziness, syncope, edema , weight change or claudication. 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 Denies snoring    Review of Systems:  Constitutional: Denies  fatigue, weakness, night sweats or fever. HEENT: Denies new visual changes, ringing in ears, nosebleeds, nasal congestion  Respiratory: Denies new or change in SOB, PND, orthopnea or cough. Cardiovascular: see HPI  GI: Denies N/V, diarrhea, constipation, abdominal pain, change in bowel habits, melena or hematochezia  : Denies urinary frequency, urgency, incontinence, hematuria or dysuria. Skin: Denies rash, hives, or cyanosis  Musculoskeletal: Denies joint or muscle aches/pain  Neurological: Denies syncope or TIA-like symptoms.   Psychiatric: Denies anxiety, insomnia or depression      Past Medical History:   Diagnosis Date Cardiomyopathy ([facility]     Diabetes mellitus ([facility]     GERD (gastroesophageal reflux disease)     WPW (Dorothy-Parkinson-White syndrome)      Past Surgical History:   Procedure Laterality Date    HERNIA REPAIR       Family History   Problem Relation Age of Onset    Diabetes Father      Social History     Tobacco Use    Smoking status: Never    Smokeless tobacco: Never   Substance Use Topics    Alcohol use: No    Drug use: No       Allergies   Allergen Reactions    Penicillins Hives     Current Outpatient Medications   Medication Sig Dispense Refill    aspirin 81 MG EC tablet Take 1 tablet by mouth daily 30 tablet 3    insulin glargine (LANTUS SOLOSTAR) 100 UNIT/ML injection pen Inject 15 Units into the skin nightly 5 Adjustable Dose Pre-filled Pen Syringe 0    atorvastatin (LIPITOR) 40 MG tablet Take 1 tablet by mouth nightly 30 tablet 3    metoprolol succinate (TOPROL XL) 25 MG extended release tablet Take 1 tablet by mouth daily 30 tablet 3    lisinopril (PRINIVIL;ZESTRIL) 5 MG tablet Take 1 tablet by mouth daily 30 tablet 3    blood glucose monitor strips Test 4 times a day & as needed for symptoms of irregular blood glucose. Dispense sufficient amount for indicated testing frequency plus additional to accommodate PRN testing needs. 124 strip 0    metFORMIN (GLUCOPHAGE-XR) 500 MG extended release tablet Take 1 tablet by mouth daily (with breakfast) 30 tablet 1     No current facility-administered medications for this visit. Physical Exam:   /66 (Site: Right Upper Arm, Position: Sitting, Cuff Size: Medium Adult)   Pulse 80   Ht 6' 1\" (1.854 m)   Wt 161 lb (73 kg)   SpO2 98%   BMI 21.24 kg/m²   Wt Readings from Last 2 Encounters:   10/13/22 161 lb (73 kg)   09/28/22 157 lb 14.4 oz (71.6 kg)     Constitutional: He is oriented to person, place, and time. He appears well-developed and well-nourished. In no acute distress. HEENT: Normocephalic and atraumatic. Sclerae anicteric. No xanthelasmas.    Neck: Supple. No JVD present. Carotids without bruits. No  thyromegaly present. Cardiovascular: RRR, normal S1 and S2; no murmur/gallop or rub  Pulmonary/Chest: Effort normal.  Lungs clear to auscultation. Chest wall nontender. No wheezes/rhonchi/rales  Abdominal: soft, nontender, nondistended. + bowel sounds; no hepatomegaly   Extremities: No edema, cyanosis, or clubbing. Pulses are 2+ radial/carotid/DP/PT bilaterally. Cap refill brisk. Neurological: No focal deficit. Skin: Skin is warm and dry. Psychiatric: He has a normal mood and affect. His speech is normal and behavior is normal.      Lab Review:   Lab Results   Component Value Date/Time    TRIG 199 09/26/2022 05:50 AM    HDL 40 09/26/2022 05:50 AM    HDL 22 03/04/2010 08:41 AM    LDLCALC 126 09/26/2022 05:50 AM    LABVLDL 40 09/26/2022 05:50 AM     Lab Results   Component Value Date/Time     09/28/2022 04:51 AM    K 3.8 09/28/2022 04:51 AM    K 3.8 09/27/2022 06:48 AM     09/28/2022 04:51 AM    CO2 24 09/28/2022 04:51 AM    BUN 14 09/28/2022 04:51 AM    CREATININE 0.7 09/28/2022 04:51 AM    GLUCOSE 179 09/28/2022 04:51 AM    CALCIUM 9.1 09/28/2022 04:51 AM      Lab Results   Component Value Date    WBC 7.1 09/25/2022    HGB 13.6 09/25/2022    HCT 39.9 (L) 09/25/2022    MCV 90.5 09/25/2022     09/25/2022 9/27/2022 Cardiac Cath (Dr. Yin Howe):     1. Left main coronary artery was normal. It gave off the left anterior descending artery and left circumflex. 2. Left anterior descending artery was normal. It was moderate in size. It gave off septal perforators and a moderate sized diagonal branch. The LAD covered the entire apex of the left ventricle. 3. Left circumflex was normal. It was moderate in size. There was a moderate sized obtuse marginal branch. 4. Right coronary artery was normal. It was moderate in size and was the dominant artery. 5. Left ventriculogram showed LVEF at 40%. Wall motion was globally hypokinetic. Nisha Chain There was no significant mitral valve or aortic valve disease noted. LVEDP was normal. There was no gradient noted across the aortic valve during pullback of the catheter. 9/27/2022 Echo:  The left ventricular systolic function is mildly reduced with an ejection fraction of 35-40 %. There is hypokinesis of the basal inferior, inferior and inferolateral walls. Normal left ventricular size with moderate concentric left ventricular hypertrophy. Grade I diastolic dysfunction with normal filling pressure. Mild mitral regurgitation. 9/26/2022 Lexiscan-myoview:  Large, severe, fixed, basal to mid inferior/inferoseptal perfusion defect with associated hypokinesis. The left ventricle appears enlarged  with moderately decreased LV function. Post-stress LVEF is 36%. Visual TID is present. Onset of wide complex rhythm with lexiscan -  suspected rate-dependent LBBB. Abnormal study. Overall findings represent a high risk scan. Findings suggest prior infarction of the RCA territory. The presence of  transient ischemic dilation suggests possible multi-vessel disease. 3/4/2010 Echo:   Echocardiogram with a Doppler shows left ventricular   hypertrophy with normal ejection fraction. There is diastolic   dysfunction of left ventricle. No valvular heart disease is seen. There is no pericardial effusion. Assessment:    1. Nonischemic cardiomyopathy (HCC)  -LVEF 35-40% on recent echo  -no overt sxs of CHF (NYHA class I)  -continue lisinopril, Toprol  -not on aldactone d/t hypotension    2. Primary hypertension  -very well controlled  -@ goal BP < 130/80  -continue medical management    3. WPW (Dorothy-Parkinson-White syndrome)  -following up with EP (Dr. Sarah Perez)    4. Abnormal cardiovascular stress test  -follow up cardiac cath in 9/2022 without significant CAD  -false + test    5.  New onset type 2 diabetes mellitus ([facility]  -referred to  [facility]  -he has very little insight on this and the meds he is on  -A1c > 13    6. LBBB    Plan:  Continue ASA, statin, lisinopril and Toprol  Emphasized and discussed low-fat/low sodium diet, monitoring of daily weights, fluid restriction, worsening signs and symptoms of heart failure and when to call, and the importance of regular exercise and activity. Refer to endocrinology/diabetes management  Advised he needs to find a Primary Care Provider  He has little insight to his condition. Educated on cardiomyopathy and advised of importance of having DM treated  Follow up with EP next month as scheduled and in 3-4 months with Dr. Nilda Lopez    Return for as scheduled with Dr. Natali Pineda in November; with Dr. Nilda Lopez in 3-4 months . Thanks for allowing me to participate in the care of this patient.       AURA Kim  [facility] , [address] [facility] [place] [address]  Office: [phone]  Fax: [phone]      Electronically signed by FAINA Barroso CNP on 10/13/2022 at 11:39 AM

## 2022-10-13 ENCOUNTER — OFFICE VISIT (OUTPATIENT)
Dept: CARDIOLOGY CLINIC | Age: 52
End: 2022-10-13
Payer: COMMERCIAL

## 2022-10-13 VITALS
WEIGHT: 161 LBS | BODY MASS INDEX: 21.34 KG/M2 | DIASTOLIC BLOOD PRESSURE: 66 MMHG | HEIGHT: 73 IN | SYSTOLIC BLOOD PRESSURE: 110 MMHG | HEART RATE: 80 BPM | OXYGEN SATURATION: 98 %

## 2022-10-13 DIAGNOSIS — I45.6 WPW (WOLFF-PARKINSON-WHITE SYNDROME): ICD-10-CM

## 2022-10-13 DIAGNOSIS — I10 PRIMARY HYPERTENSION: ICD-10-CM

## 2022-10-13 DIAGNOSIS — R94.39 ABNORMAL CARDIOVASCULAR STRESS TEST: ICD-10-CM

## 2022-10-13 DIAGNOSIS — E11.9 NEW ONSET TYPE 2 DIABETES MELLITUS (HCC): ICD-10-CM

## 2022-10-13 DIAGNOSIS — I42.8 NONISCHEMIC CARDIOMYOPATHY (HCC): Primary | ICD-10-CM

## 2022-10-13 PROCEDURE — 3046F HEMOGLOBIN A1C LEVEL >9.0%: CPT | Performed by: NURSE PRACTITIONER

## 2022-10-13 PROCEDURE — 99214 OFFICE O/P EST MOD 30 MIN: CPT | Performed by: NURSE PRACTITIONER

## 2022-10-13 NOTE — PATIENT INSTRUCTIONS
Patient was seen in office on October 13, 2022 in follow up from recent hospital stay.     Continue same medications    Referred to Endocrinology

## 2022-10-21 LAB
ANION GAP SERPL CALCULATED.3IONS-SCNC: 11 MMOL/L (ref 5–13)
BUN / CREAT RATIO: 15
BUN BLDV-MCNC: 15 MG/DL (ref 7–25)
CALCIUM SERPL-MCNC: 10.6 MG/DL (ref 8.8–10.9)
CHLORIDE BLD-SCNC: 102 MMOL/L (ref 98–110)
CO2: 28 MMOL/L (ref 22–29)
CREAT SERPL-MCNC: 1.01 MG/DL (ref 0.5–1.3)
EGFR (CKD-EPI): 89 SEE NOTE
GLUCOSE BLD-MCNC: 153 MG/DL (ref 71–99)
POTASSIUM SERPL-SCNC: 4.3 MMOL/L (ref 3.5–5.1)
SODIUM BLD-SCNC: 141 MMOL/L (ref 135–146)

## 2022-10-24 ENCOUNTER — TELEPHONE (OUTPATIENT)
Dept: CARDIOLOGY | Age: 52
End: 2022-10-24

## 2022-11-21 ENCOUNTER — OFFICE VISIT (OUTPATIENT)
Dept: CARDIOLOGY CLINIC | Age: 52
End: 2022-11-21

## 2022-11-21 VITALS
HEART RATE: 68 BPM | HEIGHT: 73 IN | OXYGEN SATURATION: 98 % | BODY MASS INDEX: 22.24 KG/M2 | WEIGHT: 167.8 LBS | DIASTOLIC BLOOD PRESSURE: 70 MMHG | SYSTOLIC BLOOD PRESSURE: 116 MMHG

## 2022-11-21 DIAGNOSIS — I42.8 NON-ISCHEMIC CARDIOMYOPATHY (HCC): Primary | ICD-10-CM

## 2022-11-21 DIAGNOSIS — I44.7 LBBB (LEFT BUNDLE BRANCH BLOCK): ICD-10-CM

## 2022-11-21 NOTE — PROGRESS NOTES
[address]   Cardiac Consultation  Date: 11/21/22  Patient Name: Margaret Art  YOB: 1970    Primary Care Physician: Veto Montgomery MD    CHIEF COMPLAINT:   Chief Complaint   Patient presents with    Follow-Up from Hospital    Other     LBBB     HPI:  Margaret Art is a 46 y.o. male was admitted on 9/24/2022 with hyperglycemia. EP consulted for WPW and new LBBB. On 9/26/2022 stress test showed large, severe, fixed, basal to mid inferior/ inferoseptal perfusion defect with associated hypokinesis. On 9/27/2022 LHC showed normal coronaries. Echo showed an EF of 35-40% with hypokinesis of the basal inferior, inferior, and inferolateral walls. Rhythm has been SR with intermittent pre-excitation. Today, 11/21/2022, he reports that he is doing ok. He is active with walking and running and tolerates this well. He is currently working with loading and unloading trucks and has no issues with this. He is taking his medications as prescribed. Patient denies current edema, chest pain, sob, palpitations, dizziness or syncope. Past Medical History:   has a past medical history of Cardiomyopathy ([address] Diabetes mellitus ([address] GERD (gastroesophageal reflux disease), and WPW (Dorothy-Parkinson-White syndrome). Surgical History:   has a past surgical history that includes hernia repair. Social History:   reports that he has never smoked. He has never used smokeless tobacco. He reports that he does not drink alcohol and does not use drugs. Family History:  family history includes Diabetes in his father.      Home Medications:  Outpatient Encounter Medications as of 11/21/2022   Medication Sig Dispense Refill    aspirin 81 MG EC tablet Take 1 tablet by mouth daily 30 tablet 3    insulin glargine (LANTUS SOLOSTAR) 100 UNIT/ML injection pen Inject 15 Units into the skin nightly 5 Adjustable Dose Pre-filled Pen Syringe 0    atorvastatin (LIPITOR) 40 MG tablet Take 1 tablet by mouth nightly 30 tablet 3    metoprolol succinate (TOPROL XL) 25 MG extended release tablet Take 1 tablet by mouth daily 30 tablet 3    lisinopril (PRINIVIL;ZESTRIL) 5 MG tablet Take 1 tablet by mouth daily 30 tablet 3    blood glucose monitor strips Test 4 times a day & as needed for symptoms of irregular blood glucose. Dispense sufficient amount for indicated testing frequency plus additional to accommodate PRN testing needs. 124 strip 0    metFORMIN (GLUCOPHAGE-XR) 500 MG extended release tablet Take 1 tablet by mouth daily (with breakfast) 30 tablet 1     No facility-administered encounter medications on file as of 11/21/2022. Allergies:  Penicillins     Review of Systems   Constitutional: Negative. HENT: Negative. Eyes: Negative. Respiratory: Negative. Cardiovascular: Negative. Gastrointestinal: Negative. Genitourinary: Negative. Musculoskeletal: Negative. Skin: Negative. Neurological: Negative. Hematological: Negative. Psychiatric/Behavioral: Negative. /70   Pulse 68   Ht 6' 1\" (1.854 m)   Wt 167 lb 12.8 oz (76.1 kg)   SpO2 98%   BMI 22.14 kg/m²       Objective:  Physical Exam   Constitutional: He is oriented to person, place, and time. He appears well-developed and well-nourished. HENT:   Head: Normocephalic and atraumatic. Eyes: Pupils are equal, round, and reactive to light. Neck: Normal range of motion. Cardiovascular: Normal rate, regular rhythm and normal heart sounds. Pulmonary/Chest: Effort normal and breath sounds normal.   Abdominal: Soft. No tenderness. Musculoskeletal: Normal range of motion. He exhibits no edema. Neurological: He is alert and oriented to person, place, and time. Skin: Skin is warm and dry. Psychiatric: He has a normal mood and affect. Assessment:  NICM- plan for cardiac MRI  LBBB- narrow QRS at OV 11/2022  Ventricular pre excitation    Plan:  Cardiac MRI at [facility]  Establish with PCP. List given. Follow up in 3 months. QUALITY MEASURES  1. Tobacco Cessation Counseling: NA  2. Retake of BP if >140/90:   NA  3. Documentation to PCP/referring for new patient:  Sent to PCP at close of office visit  4. CAD patient on anti-platelet: NA  5. CAD patient on STATIN therapy:  Yes  6. Patient with CHF and aFib on anticoagulation: NA    I, Jagdeep Burgos RN, am scribing for and in the presence of Dr. Altamease Lennox. 11/21/22 9:13 AM   Jagdeep Burgos RN    I, Dr. Altamease Lennox, personally performed the services described in this documentation as scribed by Jagdeep Burgos RN in my presence, and it is both accurate and complete.      Altamease Lennox, M.D.

## 2022-11-21 NOTE — PATIENT INSTRUCTIONS
Plan:  Cardiac MRI at [facility]  Establish with PCP. List given. Follow up in 3 months. Your provider has ordered testing for further evaluation. An order/prescription has been included in your paper work. To schedule outpatient testing, contact Central Scheduling by calling [address] [place] [address] ([phone]).

## 2023-01-06 ENCOUNTER — OFFICE VISIT (OUTPATIENT)
Dept: FAMILY MEDICINE CLINIC | Age: 53
End: 2023-01-06
Payer: COMMERCIAL

## 2023-01-06 VITALS
WEIGHT: 168 LBS | BODY MASS INDEX: 22.26 KG/M2 | OXYGEN SATURATION: 95 % | HEIGHT: 73 IN | SYSTOLIC BLOOD PRESSURE: 122 MMHG | DIASTOLIC BLOOD PRESSURE: 66 MMHG | HEART RATE: 72 BPM

## 2023-01-06 DIAGNOSIS — I42.8 NON-ISCHEMIC CARDIOMYOPATHY (HCC): ICD-10-CM

## 2023-01-06 DIAGNOSIS — R03.0 ELEVATED BP WITHOUT DIAGNOSIS OF HYPERTENSION: ICD-10-CM

## 2023-01-06 DIAGNOSIS — F41.9 ANXIETY: ICD-10-CM

## 2023-01-06 DIAGNOSIS — G62.9 PERIPHERAL POLYNEUROPATHY: ICD-10-CM

## 2023-01-06 DIAGNOSIS — Z28.21 INFLUENZA VACCINATION DECLINED: ICD-10-CM

## 2023-01-06 DIAGNOSIS — E11.42 TYPE 2 DIABETES MELLITUS WITH DIABETIC POLYNEUROPATHY, WITHOUT LONG-TERM CURRENT USE OF INSULIN (HCC): ICD-10-CM

## 2023-01-06 DIAGNOSIS — E11.42 TYPE 2 DIABETES MELLITUS WITH DIABETIC POLYNEUROPATHY, WITHOUT LONG-TERM CURRENT USE OF INSULIN (HCC): Primary | ICD-10-CM

## 2023-01-06 PROBLEM — I10 BENIGN ESSENTIAL HTN: Status: RESOLVED | Noted: 2022-09-25 | Resolved: 2023-01-06

## 2023-01-06 LAB
CREATININE URINE: 41.6 MG/DL (ref 39–259)
HBA1C MFR BLD: 9.2 %
MICROALBUMIN UR-MCNC: 1.5 MG/DL
MICROALBUMIN/CREAT UR-RTO: 36.1 MG/G (ref 0–30)
VITAMIN B-12: 918 PG/ML (ref 211–911)

## 2023-01-06 PROCEDURE — 3046F HEMOGLOBIN A1C LEVEL >9.0%: CPT | Performed by: STUDENT IN AN ORGANIZED HEALTH CARE EDUCATION/TRAINING PROGRAM

## 2023-01-06 PROCEDURE — 99204 OFFICE O/P NEW MOD 45 MIN: CPT | Performed by: STUDENT IN AN ORGANIZED HEALTH CARE EDUCATION/TRAINING PROGRAM

## 2023-01-06 PROCEDURE — 83036 HEMOGLOBIN GLYCOSYLATED A1C: CPT | Performed by: STUDENT IN AN ORGANIZED HEALTH CARE EDUCATION/TRAINING PROGRAM

## 2023-01-06 RX ORDER — DULOXETIN HYDROCHLORIDE 30 MG/1
30 CAPSULE, DELAYED RELEASE ORAL DAILY
Qty: 30 CAPSULE | Refills: 1 | Status: SHIPPED | OUTPATIENT
Start: 2023-01-06

## 2023-01-06 RX ORDER — ISOPROPYL ALCOHOL 0.75 G/1
SWAB TOPICAL
COMMUNITY
Start: 2022-09-29

## 2023-01-06 RX ORDER — INSULIN GLARGINE 100 [IU]/ML
15 INJECTION, SOLUTION SUBCUTANEOUS NIGHTLY
Qty: 5 ADJUSTABLE DOSE PRE-FILLED PEN SYRINGE | Refills: 3 | Status: SHIPPED | OUTPATIENT
Start: 2023-01-06

## 2023-01-06 RX ORDER — METFORMIN HYDROCHLORIDE 500 MG/1
1000 TABLET, EXTENDED RELEASE ORAL 2 TIMES DAILY
Qty: 120 TABLET | Refills: 1 | Status: SHIPPED | OUTPATIENT
Start: 2023-01-06 | End: 2023-02-05

## 2023-01-06 RX ORDER — METOPROLOL SUCCINATE 25 MG/1
TABLET, EXTENDED RELEASE ORAL
COMMUNITY
Start: 2010-03-12

## 2023-01-06 RX ORDER — BLOOD-GLUCOSE METER
KIT MISCELLANEOUS
COMMUNITY
Start: 2022-09-29

## 2023-01-06 RX ORDER — GLUCOSAMINE HCL/CHONDROITIN SU 500-400 MG
CAPSULE ORAL
Qty: 124 STRIP | Refills: 1 | Status: SHIPPED | OUTPATIENT
Start: 2023-01-06

## 2023-01-06 RX ORDER — PEN NEEDLE, DIABETIC 31 G X1/4"
NEEDLE, DISPOSABLE MISCELLANEOUS
COMMUNITY
Start: 2022-09-30

## 2023-01-06 RX ORDER — ASPIRIN 81 MG/1
TABLET ORAL
COMMUNITY
Start: 2010-03-12

## 2023-01-06 RX ORDER — LANOLIN ALCOHOL/MO/W.PET/CERES
1000 CREAM (GRAM) TOPICAL DAILY
Qty: 30 TABLET | Refills: 3 | Status: SHIPPED | OUTPATIENT
Start: 2023-01-06

## 2023-01-06 RX ORDER — LANCETS 33 GAUGE
EACH MISCELLANEOUS
COMMUNITY
Start: 2022-09-29

## 2023-01-06 RX ORDER — INSULIN GLARGINE-YFGN 100 [IU]/ML
INJECTION, SOLUTION SUBCUTANEOUS
COMMUNITY
Start: 2022-09-29 | End: 2023-01-06 | Stop reason: CLARIF

## 2023-01-06 ASSESSMENT — ANXIETY QUESTIONNAIRES
3. WORRYING TOO MUCH ABOUT DIFFERENT THINGS: 1
5. BEING SO RESTLESS THAT IT IS HARD TO SIT STILL: 0
IF YOU CHECKED OFF ANY PROBLEMS ON THIS QUESTIONNAIRE, HOW DIFFICULT HAVE THESE PROBLEMS MADE IT FOR YOU TO DO YOUR WORK, TAKE CARE OF THINGS AT HOME, OR GET ALONG WITH OTHER PEOPLE: NOT DIFFICULT AT ALL
1. FEELING NERVOUS, ANXIOUS, OR ON EDGE: 3
2. NOT BEING ABLE TO STOP OR CONTROL WORRYING: 2
GAD7 TOTAL SCORE: 12
7. FEELING AFRAID AS IF SOMETHING AWFUL MIGHT HAPPEN: 2
4. TROUBLE RELAXING: 1
6. BECOMING EASILY ANNOYED OR IRRITABLE: 3

## 2023-01-06 ASSESSMENT — PATIENT HEALTH QUESTIONNAIRE - PHQ9
SUM OF ALL RESPONSES TO PHQ QUESTIONS 1-9: 5
9. THOUGHTS THAT YOU WOULD BE BETTER OFF DEAD, OR OF HURTING YOURSELF: 0
1. LITTLE INTEREST OR PLEASURE IN DOING THINGS: 0
4. FEELING TIRED OR HAVING LITTLE ENERGY: 0
10. IF YOU CHECKED OFF ANY PROBLEMS, HOW DIFFICULT HAVE THESE PROBLEMS MADE IT FOR YOU TO DO YOUR WORK, TAKE CARE OF THINGS AT HOME, OR GET ALONG WITH OTHER PEOPLE: 0
SUM OF ALL RESPONSES TO PHQ QUESTIONS 1-9: 5
6. FEELING BAD ABOUT YOURSELF - OR THAT YOU ARE A FAILURE OR HAVE LET YOURSELF OR YOUR FAMILY DOWN: 1
7. TROUBLE CONCENTRATING ON THINGS, SUCH AS READING THE NEWSPAPER OR WATCHING TELEVISION: 0
SUM OF ALL RESPONSES TO PHQ QUESTIONS 1-9: 5
3. TROUBLE FALLING OR STAYING ASLEEP: 3
SUM OF ALL RESPONSES TO PHQ9 QUESTIONS 1 & 2: 1
SUM OF ALL RESPONSES TO PHQ QUESTIONS 1-9: 5
8. MOVING OR SPEAKING SO SLOWLY THAT OTHER PEOPLE COULD HAVE NOTICED. OR THE OPPOSITE, BEING SO FIGETY OR RESTLESS THAT YOU HAVE BEEN MOVING AROUND A LOT MORE THAN USUAL: 0
2. FEELING DOWN, DEPRESSED OR HOPELESS: 1
5. POOR APPETITE OR OVEREATING: 0

## 2023-01-06 ASSESSMENT — ENCOUNTER SYMPTOMS
VOMITING: 0
BLOOD IN STOOL: 0
SHORTNESS OF BREATH: 0
NAUSEA: 0

## 2023-01-06 NOTE — PROGRESS NOTES
[facility]  Establish care visit   2023    Stephan Harding (:  1970) is a 45 y.o. male, here to establish care. Chief Complaint   Patient presents with    New Patient        ASSESSMENT/ PLAN  1. Type 2 diabetes mellitus with diabetic polyneuropathy, without long-term current use of insulin ([facility])  Hemoglobin A1C   Date Value Ref Range Status   2023 9.2 % Final      - poorly-controlled, FBG 240smg/dL. Reports one-time hypoglycemic episode 2 days ago. Compliant with meds. However he ran out of medications recently. Complicated by known neuropathy without nephropathy. -A1c improved from 13 to 9 with goal A1c less than 7%. Extensive discussion and education provided for patient regarding his chronic medical condition and management.  - Education provided on self-management and discussed lifestyle modifications of moderate weight loss, regular physical activity with goal of 150min/week of moderate aerobic exercise to reduce risk of DM complications and improve glycemic control, reduced saturated fat intake, monitor carbohydrates. Also given patient extensive discussion on action plan for when hypoglycemia occurs  - screening for CHD, BP normotensive, Body mass index is 22.16 kg/m². with ASCVD risk high dose on statin ASCVD 13%   -If A1c is less than 9 at next visit, will transition patient to either SGLT 2 or GLP-1 for cardiovascular benefits. Due to time constraints will defer diabetic foot exam at next visit and address the rest of diabetes health maintenance  - POCT glycosylated hemoglobin (Hb A1C)  - Alcohol Swabs (B-D SINGLE USE SWABS REGULAR) PADS  - Blood Glucose Monitoring Suppl (ONETOUCH VERIO REFLECT) w/Device KIT  - TRUEPLUS 5-BEVEL PEN NEEDLES 31G X 6 MM MISC; USE AS DIRECTED TO INJECT INSULIN NIGHTLY  - Lancets (ONETOUCH DELICA PLUS [id number]) MISC  - metFORMIN (GLUCOPHAGE-XR) 500 MG extended release tablet;  Take 2 tablets by mouth in the morning and at bedtime  Dispense: 120 tablet; Refill: 1  - Vitamin B12; Future  - vitamin B-12 (CYANOCOBALAMIN) 1000 MCG tablet; Take 1 tablet by mouth daily  Dispense: 30 tablet; Refill: 3  - AFL - Ranjit Rodriges MD Ophthalmology, [facility]  - [facility] GLUCOSE) 4 g chewable tablet; Take 4 tablets by mouth as needed for Low blood sugar  Dispense: 60 tablet; Refill: 3  - MICROALBUMIN / CREATININE URINE RATIO; Future  - insulin glargine (LANTUS SOLOSTAR) 100 UNIT/ML injection pen; Inject 15 Units into the skin nightly  Dispense: 5 Adjustable Dose Pre-filled Pen Syringe; Refill: 3  - blood glucose monitor strips; Test 4 times a day & as needed for symptoms of irregular blood glucose. Dispense sufficient amount for indicated testing frequency plus additional to accommodate PRN testing needs. Dispense: 124 strip; Refill: 1  - DULoxetine (CYMBALTA) 30 MG extended release capsule; Take 1 capsule by mouth daily  Dispense: 30 capsule; Refill: 1    2. Peripheral polyneuropathy  - metFORMIN (GLUCOPHAGE-XR) 500 MG extended release tablet; Take 2 tablets by mouth in the morning and at bedtime  Dispense: 120 tablet; Refill: 1  - Vitamin B12; Future  - vitamin B-12 (CYANOCOBALAMIN) 1000 MCG tablet; Take 1 tablet by mouth daily  Dispense: 30 tablet; Refill: 3  - DULoxetine (CYMBALTA) 30 MG extended release capsule; Take 1 capsule by mouth daily  Dispense: 30 capsule; Refill: 1    3. Elevated BP without diagnosis of hypertension  Stable. 4. Non-ischemic cardiomyopathy ([facility]  Reviewed extensive work-up and cardiology and EP notes from prior visits. History of WPW with new left bundle branch block 3 months ago. Patient to follow-up with EP with cardiac MRI in about 3 months  - aspirin 81 MG EC tablet; Take by mouth  - metoprolol succinate (TOPROL XL) 25 MG extended release tablet; Take by mouth    5.  Anxiety  NISHA 7 SCORE 1/6/2023   NISHA-7 Total Score 12     Interpretation of NISHA-7 score: 5-9 = mild anxiety, 10-14 = moderate anxiety, 15+ = severe anxiety. Recommend referral to behavioral health for scores 10 or greater. - without concern for concurrent depression.   - Discussed risks and benefits of management including psychotherapy, medication options, and time course of improvement and blackbox warning of increased suicidality  - Pt opted to start cymbalta to address both anxiety and neuropathy and titrate as tolerated  - Follow up in 4-6 weeks  - DULoxetine (CYMBALTA) 30 MG extended release capsule; Take 1 capsule by mouth daily  Dispense: 30 capsule; Refill: 1    6. Influenza vaccination declined    Total Time Spent with Patient: 50 minutes, of which greater than 50% was spent on counseling or coordinating care. Return in about 4 weeks (around 2/3/2023) for DM and anxiety. We will need diabetic foot exam, address the rest of diabetes health maintenance and titration of medications    Education provided regarding visit today. See visit diagnoses, orders and follow up recommendations. Discussed probable diagnosis, test results if available in office today and management options with patient: patient agreed to a medical plan. Will contact patient for results. HPI    Type 2 DM  Patient takes:    insulin glargine (LANTUS SOLOSTAR) 100 UNIT/ML injection pen Inject 15 Units into the skin nightly 5 Adjustable Dose Pre-filled Pen Syringe 0    lisinopril (PRINIVIL;ZESTRIL) 5 MG tablet Take 1 tablet by mouth daily 30 tablet 3    blood glucose monitor strips Test 4 times a day & as needed for symptoms of irregular blood glucose. Dispense sufficient amount for indicated testing frequency plus additional to accommodate PRN testing needs.  124 strip 0    metFORMIN (GLUCOPHAGE-XR) 500 MG extended release tablet Take 1 tablet by mouth daily (with breakfast) 30 tablet 1     - out of antiglycemics for 2 weeks    Home blood sugars - 240-260s  Compliance: no, ran out, has been taking friend's metformin  Neuropathy: yes  The patient admits to polyuria, polydipsia, nocturia (returning)  The patient denies blurred vision (been a while)  Frequency of Hypoglycemia -  46 (2 days ago), no skipping meals  Screening  - Optho: last visit NEEDS  - Foot exam: NEEDS  - Last HbA1c   Hemoglobin A1C   Date Value Ref Range Status   01/06/2023 9.2 % Final     - Last Creatinine    Lab Results   Component Value Date    CREATININE 1.01 10/21/2022       - Last Urine Microalbumin  Lab Results   Component Value Date    LABMICR Not Indicated 09/24/2022        - Last lipids     Lab Results   Component Value Date    CHOL 206 (H) 09/26/2022    CHOL 111 03/04/2010     Lab Results   Component Value Date    TRIG 199 (H) 09/26/2022    TRIG 120 03/04/2010     Lab Results   Component Value Date    HDL 40 09/26/2022    HDL 22 (L) 03/04/2010     Lab Results   Component Value Date    LABVLDL 40 09/26/2022    LABVLDL 24 03/04/2010     Lab Results   Component Value Date    LDLCALC 126 (H) 09/26/2022      - Last LFT's   Lab Results   Component Value Date    ALT 17 09/25/2022    AST 14 (L) 09/25/2022    ALKPHOS 135 (H) 09/25/2022    BILITOT <0.2 09/25/2022        - blood pressure      BP Readings from Last 10 Encounters:   01/06/23 122/66   11/21/22 116/70   10/13/22 110/66   09/28/22 120/79   05/16/17 132/88      History of nonischemic cardiomyopathy  - established with cards, recent hospitalization on 9/24/2022 with new left bundle branch block and intermittent Dorothy-Parkinson-White. Stress test showed left ventricular dysfunction and ischemia with EF of 40%. Heart cath unremarkable. Patient is also referred to cardiac EP per cardiology note on 10/2022  -Per EP note back in November 2022 plan for cardiac MRI and follow-up at 3 months    Anxiety  - GAD7 12, moderate   - 6 months, works unloading trucks all day  - problems sleeping 3-4 hours a night, delayed onset.  Sometimes phone, youtube  - worry about his health  - not a fan of psychotherapy  - no SI, HI    Works 3x/week with weight lifting. Non-smoker    ROS  Review of Systems   Constitutional:  Negative for chills and fever. HENT:  Negative for congestion. Eyes:  Negative for visual disturbance. Respiratory:  Negative for shortness of breath. Cardiovascular:  Negative for chest pain. Gastrointestinal:  Negative for blood in stool, nausea and vomiting. Genitourinary:  Negative for frequency and hematuria. Neurological:  Positive for numbness. Negative for weakness. Psychiatric/Behavioral:  Negative for dysphoric mood. The patient is nervous/anxious (6 months). HISTORIES  Current Outpatient Medications on File Prior to Visit   Medication Sig Dispense Refill    aspirin 81 MG EC tablet Take by mouth      metoprolol succinate (TOPROL XL) 25 MG extended release tablet Take by mouth      aspirin 81 MG EC tablet Take 1 tablet by mouth daily 30 tablet 3    atorvastatin (LIPITOR) 40 MG tablet Take 1 tablet by mouth nightly 30 tablet 3    metoprolol succinate (TOPROL XL) 25 MG extended release tablet Take 1 tablet by mouth daily 30 tablet 3    lisinopril (PRINIVIL;ZESTRIL) 5 MG tablet Take 1 tablet by mouth daily 30 tablet 3    Alcohol Swabs (B-D SINGLE USE SWABS REGULAR) PADS       Blood Glucose Monitoring Suppl (ONETOUCH VERIO REFLECT) w/Device KIT       TRUEPLUS 5-BEVEL PEN NEEDLES 31G X 6 MM MISC USE AS DIRECTED TO INJECT INSULIN NIGHTLY      Lancets (ONETOUCH DELICA PLUS [id number]) MISC        No current facility-administered medications on file prior to visit.         Allergies   Allergen Reactions    Penicillins Hives    Amoxicillin Rash       Past Medical History:   Diagnosis Date    Benign essential HTN 9/25/2022    Cardiomyopathy ([address]     Diabetes mellitus ([address]     GERD (gastroesophageal reflux disease)     WPW (Dorothy-Parkinson-White syndrome)        Patient Active Problem List   Diagnosis    Type 2 diabetes mellitus with diabetic polyneuropathy, without long-term current use of insulin ([address]    Acute hyperglycemia LBBB (left bundle branch block)    Elevated BP without diagnosis of hypertension    Peripheral neuropathy    Hyponatremia    Hypokalemia    Abnormal cardiovascular stress test    Non-ischemic cardiomyopathy ([address]    Umbilical hernia       Past Surgical History:   Procedure Laterality Date    HERNIA REPAIR         Social History     Socioeconomic History    Marital status: Single     Spouse name: Not on file    Number of children: Not on file    Years of education: Not on file    Highest education level: Not on file   Occupational History    Not on file   Tobacco Use    Smoking status: Never    Smokeless tobacco: Never   Vaping Use    Vaping Use: Never used   Substance and Sexual Activity    Alcohol use: No    Drug use: No    Sexual activity: Not on file   Other Topics Concern    Not on file   Social History Narrative    Not on file     Social Determinants of Health     Financial Resource Strain: Not on file   Food Insecurity: Not on file   Transportation Needs: Not on file   Physical Activity: Not on file   Stress: Not on file   Social Connections: Not on file   Intimate Partner Violence: Not on file   Housing Stability: Not on file        Family History   Problem Relation Age of Onset    Diabetes Father        PE  Vitals:    01/06/23 0946   BP: 122/66   Pulse: 72   SpO2: 95%   Weight: 168 lb (76.2 kg)   Height: 6' 1\" (1.854 m)     Estimated body mass index is 22.16 kg/m² as calculated from the following:    Height as of this encounter: 6' 1\" (1.854 m). Weight as of this encounter: 168 lb (76.2 kg). Physical Exam  Vitals and nursing note reviewed. Constitutional:       General: He is not in acute distress. Appearance: Normal appearance. He is normal weight. He is not ill-appearing. HENT:      Head: Normocephalic and atraumatic. Right Ear: External ear normal.      Left Ear: External ear normal.   Cardiovascular:      Rate and Rhythm: Normal rate and regular rhythm. Pulses: Normal pulses. Heart sounds: Normal heart sounds. Pulmonary:      Effort: Pulmonary effort is normal.      Breath sounds: Normal breath sounds. Musculoskeletal:      Cervical back: Normal range of motion. Neurological:      General: No focal deficit present. Mental Status: He is alert and oriented to person, place, and time. Mental status is at baseline. Psychiatric:         Mood and Affect: Mood normal.         Behavior: Behavior normal.         Thought Content: Thought content normal.         Judgment: Judgment normal.      Immunization History   Administered Date(s) Administered    Tdap (Boostrix, Adacel) 11/18/2008       Health Maintenance   Topic Date Due    COVID-19 Vaccine (1) Never done    Pneumococcal 0-64 years Vaccine (1 - PCV) Never done    Diabetic foot exam  Never done    Depression Screen  Never done    HIV screen  Never done    Diabetic Alb to Cr ratio (uACR) test  Never done    Diabetic retinal exam  Never done    Hepatitis C screen  Never done    Hepatitis B vaccine (1 of 3 - Risk 3-dose series) Never done    Colorectal Cancer Screen  Never done    DTaP/Tdap/Td vaccine (2 - Td or Tdap) 11/18/2018    Shingles vaccine (1 of 2) Never done    Flu vaccine (1) Never done    A1C test (Diabetic or Prediabetic)  04/06/2023    Lipids  09/26/2023    GFR test (Diabetes, CKD 3-4, OR last GFR 15-59)  09/28/2023    Hepatitis A vaccine  Aged Out    Hib vaccine  Aged Out    Meningococcal (ACWY) vaccine  Aged Out       Portions of record reviewed for pertinent issues: active problem list,medication list,allergies,social history, health maintenance. Recent and past labs, tests and consults also reviewed. Recent or new meds also reviewed. Dena Murphy, DO    This dictation was generated by voice recognition computer software. Although all attempts are made to edit the dictation for accuracy, there may be errors in the transcription that are not intended.

## 2023-01-06 NOTE — PATIENT INSTRUCTIONS
Metformin - if tolerating (no significant gastrointestinal issues) for 1 week metformin 1000mg, week 2 metformin 1000mg in the morning then 500mg at night. Week 3, metformin 100mg in the morning and 1000mg at night  Check your sugars twice a day: once in the morning before eating and at night before going to bed/giving yourself the lantus 15U.  Write down your blood glucose numbers

## 2023-01-11 ENCOUNTER — TELEPHONE (OUTPATIENT)
Dept: FAMILY MEDICINE CLINIC | Age: 53
End: 2023-01-11

## 2023-01-11 DIAGNOSIS — E11.42 TYPE 2 DIABETES MELLITUS WITH DIABETIC POLYNEUROPATHY, WITHOUT LONG-TERM CURRENT USE OF INSULIN (HCC): Primary | ICD-10-CM

## 2023-01-11 RX ORDER — INSULIN GLARGINE-YFGN 100 [IU]/ML
15 INJECTION, SOLUTION SUBCUTANEOUS NIGHTLY
Qty: 10 ML | Refills: 0 | Status: SHIPPED | OUTPATIENT
Start: 2023-01-11 | End: 2023-02-06

## 2023-01-11 NOTE — TELEPHONE ENCOUNTER
Pt's friend Renee Mao called with pt in the background, gave verbal okay to speak with friend. They state the pharmacy is telling them they can not fill the patients insulin. Let them know we have not got a call or a PA request from the pharmacy so they need to call the pharmacy back and let them know we need this to be done so we know if we need to send something new in or just change something for insulin already sent.

## 2023-01-11 NOTE — TELEPHONE ENCOUNTER
Noted issues with insulin. Medication is generic. I agree. If prior authorization and other issues with the pharmacy is noted can initiate prior Auth. I will await updates from patient.   If this issue recurs, we can consider switching to a different [facility] [address]

## 2023-01-11 NOTE — TELEPHONE ENCOUNTER
1. Type 2 diabetes mellitus with diabetic polyneuropathy, without long-term current use of insulin ([facility]  Represcribed to be covered by pt's insurance.  Will not do further refills at this time as I suspect if we can get glucose in a better control, we can switch patient on different medication  - insulin glargine-yfgn (SEMGLEE, YFGN,) 100 UNIT/ML injection vial; Inject 15 Units into the skin at bedtime  Dispense: 10 mL; Refill: 0    Dena Beal, [facility]

## 2023-01-12 DIAGNOSIS — E11.42 TYPE 2 DIABETES MELLITUS WITH DIABETIC POLYNEUROPATHY, WITHOUT LONG-TERM CURRENT USE OF INSULIN (HCC): ICD-10-CM

## 2023-01-12 RX ORDER — PEN NEEDLE, DIABETIC 31 G X1/4"
NEEDLE, DISPOSABLE MISCELLANEOUS
Qty: 100 EACH | Refills: 2 | Status: SHIPPED | OUTPATIENT
Start: 2023-01-12

## 2023-01-12 NOTE — TELEPHONE ENCOUNTER
Last Office Visit  -  1/6/23  Next Office Visit  -  2/6/23    Last Filled  -    Last UDS -    Contract -

## 2023-01-31 DIAGNOSIS — E11.42 TYPE 2 DIABETES MELLITUS WITH DIABETIC POLYNEUROPATHY, WITHOUT LONG-TERM CURRENT USE OF INSULIN (HCC): ICD-10-CM

## 2023-01-31 DIAGNOSIS — G62.9 PERIPHERAL POLYNEUROPATHY: ICD-10-CM

## 2023-01-31 DIAGNOSIS — F41.9 ANXIETY: ICD-10-CM

## 2023-01-31 RX ORDER — METOPROLOL SUCCINATE 25 MG/1
TABLET, EXTENDED RELEASE ORAL
Qty: 90 TABLET | Refills: 1 | Status: SHIPPED | OUTPATIENT
Start: 2023-01-31

## 2023-01-31 RX ORDER — ATORVASTATIN CALCIUM 40 MG/1
40 TABLET, FILM COATED ORAL NIGHTLY
Qty: 30 TABLET | Refills: 3 | Status: SHIPPED | OUTPATIENT
Start: 2023-01-31

## 2023-01-31 RX ORDER — DULOXETIN HYDROCHLORIDE 30 MG/1
CAPSULE, DELAYED RELEASE ORAL
Qty: 30 CAPSULE | Refills: 1 | Status: SHIPPED | OUTPATIENT
Start: 2023-01-31 | End: 2023-02-01 | Stop reason: SDUPTHER

## 2023-01-31 NOTE — TELEPHONE ENCOUNTER
LOV 1/6/2023    Future Appointments   Date Time Provider Lorena Jansen   2/6/2023 11:00 AM Dena BOWEN [place] [facility]   2/7/2023 10:15 AM Joelle Hernandez MD [facility]   3/28/2023 12:15 PM Fain Corner, MD Christophe Sever [facility]

## 2023-02-01 ENCOUNTER — TELEPHONE (OUTPATIENT)
Dept: FAMILY MEDICINE CLINIC | Age: 53
End: 2023-02-01

## 2023-02-01 DIAGNOSIS — E11.42 TYPE 2 DIABETES MELLITUS WITH DIABETIC POLYNEUROPATHY, WITHOUT LONG-TERM CURRENT USE OF INSULIN (HCC): ICD-10-CM

## 2023-02-01 DIAGNOSIS — F41.9 ANXIETY: ICD-10-CM

## 2023-02-01 DIAGNOSIS — G62.9 PERIPHERAL POLYNEUROPATHY: ICD-10-CM

## 2023-02-01 RX ORDER — DULOXETIN HYDROCHLORIDE 30 MG/1
CAPSULE, DELAYED RELEASE ORAL
Qty: 90 CAPSULE | Refills: 1 | Status: SHIPPED | OUTPATIENT
Start: 2023-02-01

## 2023-02-01 NOTE — TELEPHONE ENCOUNTER
[facility] pharmacy contacted the office req a 90 day supply of patients RX for Duloxetine 30mg tabs, 90 day supply is cheaper for patient.  Please advise [facility]   [phone]

## 2023-02-06 ENCOUNTER — OFFICE VISIT (OUTPATIENT)
Dept: FAMILY MEDICINE CLINIC | Age: 53
End: 2023-02-06
Payer: COMMERCIAL

## 2023-02-06 VITALS
OXYGEN SATURATION: 99 % | HEART RATE: 61 BPM | SYSTOLIC BLOOD PRESSURE: 132 MMHG | BODY MASS INDEX: 22.66 KG/M2 | HEIGHT: 73 IN | WEIGHT: 171 LBS | RESPIRATION RATE: 16 BRPM | DIASTOLIC BLOOD PRESSURE: 78 MMHG

## 2023-02-06 DIAGNOSIS — G62.9 PERIPHERAL POLYNEUROPATHY: ICD-10-CM

## 2023-02-06 DIAGNOSIS — F41.9 ANXIETY: ICD-10-CM

## 2023-02-06 DIAGNOSIS — E16.2 HYPOGLYCEMIA: ICD-10-CM

## 2023-02-06 DIAGNOSIS — I42.8 NONISCHEMIC CARDIOMYOPATHY (HCC): ICD-10-CM

## 2023-02-06 DIAGNOSIS — M77.50 BONE SPUR OF FOOT: ICD-10-CM

## 2023-02-06 DIAGNOSIS — E11.42 TYPE 2 DIABETES MELLITUS WITH DIABETIC POLYNEUROPATHY, WITHOUT LONG-TERM CURRENT USE OF INSULIN (HCC): ICD-10-CM

## 2023-02-06 DIAGNOSIS — I10 PRIMARY HYPERTENSION: ICD-10-CM

## 2023-02-06 DIAGNOSIS — E11.42 TYPE 2 DIABETES MELLITUS WITH DIABETIC POLYNEUROPATHY, WITHOUT LONG-TERM CURRENT USE OF INSULIN (HCC): Primary | ICD-10-CM

## 2023-02-06 LAB
BASOPHILS ABSOLUTE: 0.1 K/UL (ref 0–0.2)
BASOPHILS RELATIVE PERCENT: 1.1 %
EOSINOPHILS ABSOLUTE: 0.3 K/UL (ref 0–0.6)
EOSINOPHILS RELATIVE PERCENT: 4 %
HCT VFR BLD CALC: 40.5 % (ref 40.5–52.5)
HEMOGLOBIN: 13.3 G/DL (ref 13.5–17.5)
LYMPHOCYTES ABSOLUTE: 1.5 K/UL (ref 1–5.1)
LYMPHOCYTES RELATIVE PERCENT: 21 %
MCH RBC QN AUTO: 30.8 PG (ref 26–34)
MCHC RBC AUTO-ENTMCNC: 32.8 G/DL (ref 31–36)
MCV RBC AUTO: 93.8 FL (ref 80–100)
MONOCYTES ABSOLUTE: 0.5 K/UL (ref 0–1.3)
MONOCYTES RELATIVE PERCENT: 6.8 %
NEUTROPHILS ABSOLUTE: 4.8 K/UL (ref 1.7–7.7)
NEUTROPHILS RELATIVE PERCENT: 67.1 %
PDW BLD-RTO: 12.7 % (ref 12.4–15.4)
PLATELET # BLD: 285 K/UL (ref 135–450)
PMV BLD AUTO: 8.7 FL (ref 5–10.5)
RBC # BLD: 4.31 M/UL (ref 4.2–5.9)
WBC # BLD: 7.2 K/UL (ref 4–11)

## 2023-02-06 PROCEDURE — 99215 OFFICE O/P EST HI 40 MIN: CPT | Performed by: STUDENT IN AN ORGANIZED HEALTH CARE EDUCATION/TRAINING PROGRAM

## 2023-02-06 PROCEDURE — 3046F HEMOGLOBIN A1C LEVEL >9.0%: CPT | Performed by: STUDENT IN AN ORGANIZED HEALTH CARE EDUCATION/TRAINING PROGRAM

## 2023-02-06 RX ORDER — DULOXETIN HYDROCHLORIDE 60 MG/1
CAPSULE, DELAYED RELEASE ORAL
Qty: 90 CAPSULE | Refills: 0 | Status: SHIPPED | OUTPATIENT
Start: 2023-02-06

## 2023-02-06 RX ORDER — INSULIN GLARGINE-YFGN 100 [IU]/ML
10 INJECTION, SOLUTION SUBCUTANEOUS NIGHTLY
Qty: 10 ML | Refills: 0 | Status: SHIPPED | OUTPATIENT
Start: 2023-02-06 | End: 2023-03-08

## 2023-02-06 ASSESSMENT — ANXIETY QUESTIONNAIRES
1. FEELING NERVOUS, ANXIOUS, OR ON EDGE: 1
GAD7 TOTAL SCORE: 15
5. BEING SO RESTLESS THAT IT IS HARD TO SIT STILL: 0
3. WORRYING TOO MUCH ABOUT DIFFERENT THINGS: 3
2. NOT BEING ABLE TO STOP OR CONTROL WORRYING: 3
6. BECOMING EASILY ANNOYED OR IRRITABLE: 3
4. TROUBLE RELAXING: 2
7. FEELING AFRAID AS IF SOMETHING AWFUL MIGHT HAPPEN: 3

## 2023-02-06 ASSESSMENT — ENCOUNTER SYMPTOMS
NAUSEA: 0
SHORTNESS OF BREATH: 0
VOMITING: 0
BLOOD IN STOOL: 0

## 2023-02-06 NOTE — PATIENT INSTRUCTIONS
Starting tonight only administer 10U of insulin. Check blood sugar prior to administering. If your blood sugar is >100, administer the insulin.   Start cymbalta 60mg daily

## 2023-02-06 NOTE — PROGRESS NOTES
[address] [facility]  2023    Sedrick Aldridge (:  1970) is a 45 y.o. male, here for evaluation of the following medical concerns:    Chief Complaint   Patient presents with    1 Month Follow-Up     DM, Anxiety        ASSESSMENT/ PLAN  1. Type 2 diabetes mellitus with diabetic polyneuropathy, without long-term current use of insulin (HCC)  Diabetic foot exam performed today consistent with bilateral peripheral neuropathy. Fasting blood sugars improved although worry some given that a few blood sugars are in hypoglycemic range. Patient reports he is not missing any meals and is compliant with medications. Patient is only on max dose of metformin and Lantus 15 units given at bedtime. Given history of persistent hypoglycemia, reviewed action plan once again and will de-escalate Lantus from 15 units to 10 units. Given patient floor instructions that if blood sugar at night is less than 100, to hold off on giving himself Lantus. Also discuss other types of diabetes. We will check NISHA antibodies for possible INDIRA which may guide management options for patient. Patient to call and schedule an appointment with ophthalmology for his diabetes eye exam.  We will refer patient to podiatry today given thickened nails, likely bone spurs and diabetic foot care. - Diabetic Foot Exam  - GLUTAMIC ACID DECARBOXYLASE; Future  - DULoxetine (CYMBALTA) 60 MG extended release capsule; TAKE 1 CAPSULE BY MOUTH EVERY DAY  Dispense: 90 capsule; Refill: 0  - Comprehensive Metabolic Panel; Future  - insulin glargine-yfgn (SEMGLEE, YFGN,) 100 UNIT/ML injection vial; Inject 10 Units into the skin at bedtime  Dispense: 10 mL; Refill: 0  - [facility] - Karen Madsen DPM, Podiatry, [facility]    2. Anxiety  NISHA 7 SCORE 2023   NISHA-7 Total Score 15 12     Interpretation of NISHA-7 score: 5-9 = mild anxiety, 10-14 = moderate anxiety, 15+ = severe anxiety.  Recommend referral to behavioral health for scores 10 or greater. Score similar from prior. Will escalate Cymbalta for both anxiety and neuropathy  - DULoxetine (CYMBALTA) 60 MG extended release capsule; TAKE 1 CAPSULE BY MOUTH EVERY DAY  Dispense: 90 capsule; Refill: 0    3. Hypoglycemia  See above  Discussed action plan with patient  If persistent with 10 units of Lantus, will hold off on Lantus and pursue other treatment modalities if NISHA ab neg  - GLUTAMIC ACID DECARBOXYLASE; Future  - Comprehensive Metabolic Panel; Future  - CBC with Auto Differential; Future    4. Peripheral polyneuropathy  - DULoxetine (CYMBALTA) 60 MG extended release capsule; TAKE 1 CAPSULE BY MOUTH EVERY DAY  Dispense: 90 capsule; Refill: 0    5. Bone spur of foot  - [facility] - Karen Madsen DPM, Podiatry, [facility]     Total Time Spent with Patient: 54 minutes, of which greater than 50% was spent on counseling or coordinating care. Return in about 4 weeks (around 3/6/2023) for T2DM. Education provided regarding visit today. See visit diagnoses, orders and follow up  recommendations. Portions of record reviewed for pertinent issues: active problem list,medication list,allergies,social history, health maintenance. Discussed probable diagnosis, test results if available in office today and management options with patient: patient agreed to a medical plan. Will contact patient for results. If symptoms worsen or fail to improve please contact PCP as soon as possible or go directly to the emergency department. HPI    Type 2 DM Complicated by known neuropathy without nephropathy.    Patient takes:    insulin glargine-yfgn (SEMGLEE, YFGN,) 100 UNIT/ML injection vial Inject 15 Units into the skin at bedtime 10 mL 0    metFORMIN (GLUCOPHAGE-XR) 500 MG extended release tablet Take 2 tablets by mouth in the morning and at bedtime 120 tablet 1        Home blood sugars -  but predominantly 130s-160s pt reports was taking a \"fat burner medicine\" that had a side effect of lowering sugar. Stopped 2 days ago but took it for 2 weeks  Compliance: yes  Neuropathy: yes  The patient admits to polyuria  The patient denies polydipsia, nocturia, blurred vision  Frequency of Hypoglycemia -  every 3-4 days after doing this fat burner medicine  Screening  - Optho: last visit -already referred  - Foot exam: will perform  - Last HbA1c   Hemoglobin A1C   Date Value Ref Range Status   01/06/2023 9.2 % Final     - Last Creatinine    Lab Results   Component Value Date    CREATININE 1.01 10/21/2022       - Last Urine Microalbumin  Lab Results   Component Value Date    LABMICR 1.50 01/06/2023        - Last lipids     Lab Results   Component Value Date    CHOL 206 (H) 09/26/2022    CHOL 111 03/04/2010     Lab Results   Component Value Date    TRIG 199 (H) 09/26/2022    TRIG 120 03/04/2010     Lab Results   Component Value Date    HDL 40 09/26/2022    HDL 22 (L) 03/04/2010     Lab Results   Component Value Date    LABVLDL 40 09/26/2022    LABVLDL 24 03/04/2010     Lab Results   Component Value Date    LDLCALC 126 (H) 09/26/2022      - Last LFT's   Lab Results   Component Value Date    ALT 17 09/25/2022    AST 14 (L) 09/25/2022    ALKPHOS 135 (H) 09/25/2022    BILITOT <0.2 09/25/2022        - blood pressure      BP Readings from Last 10 Encounters:   02/06/23 132/78   01/06/23 122/66   11/21/22 116/70   10/13/22 110/66   09/28/22 120/79   05/16/17 132/88      Anxiety  - last NISHA-7 12, started pt on cymbalta 30mg    ROS  Review of Systems   Constitutional:  Negative for chills and fever. HENT:  Negative for congestion. Eyes:  Negative for visual disturbance. Respiratory:  Negative for shortness of breath. Cardiovascular:  Negative for chest pain. Gastrointestinal:  Negative for blood in stool, nausea and vomiting. Genitourinary:  Negative for frequency and hematuria. Musculoskeletal:  Negative for arthralgias. Neurological:  Positive for numbness (jatinder medial foot). Negative for tremors and weakness. Psychiatric/Behavioral:  Negative for dysphoric mood. HISTORIES  Current Outpatient Medications on File Prior to Visit   Medication Sig Dispense Refill    Ferrous Sulfate (IRON PO) Take by mouth      Cholecalciferol (VITAMIN D3 PO) Take by mouth      atorvastatin (LIPITOR) 40 MG tablet TAKE 1 TABLET BY MOUTH NIGHTLY 30 tablet 3    metoprolol succinate (TOPROL XL) 25 MG extended release tablet TAKE 1 TABLET BY MOUTH EVERY DAY 90 tablet 1    TRUEPLUS 5-BEVEL PEN NEEDLES 31G X 6 MM MISC USE AS DIRECTED TO INJECT INSULIN NIGHTLY 100 each 2    Alcohol Swabs (B-D SINGLE USE SWABS REGULAR) PADS       Blood Glucose Monitoring Suppl (ONETOUCH VERIO REFLECT) w/Device KIT       Lancets (ONETOUCH DELICA PLUS [id number]) MISC       metFORMIN (GLUCOPHAGE-XR) 500 MG extended release tablet Take 2 tablets by mouth in the morning and at bedtime 120 tablet 1    vitamin B-12 (CYANOCOBALAMIN) 1000 MCG tablet Take 1 tablet by mouth daily 30 tablet 3    glucose ([facility] GLUCOSE) 4 g chewable tablet Take 4 tablets by mouth as needed for Low blood sugar 60 tablet 3    blood glucose monitor strips Test 4 times a day & as needed for symptoms of irregular blood glucose. Dispense sufficient amount for indicated testing frequency plus additional to accommodate PRN testing needs. 124 strip 1    aspirin 81 MG EC tablet Take 1 tablet by mouth daily 30 tablet 3    lisinopril (PRINIVIL;ZESTRIL) 5 MG tablet Take 1 tablet by mouth daily 30 tablet 3     No current facility-administered medications on file prior to visit.       Past Medical History:   Diagnosis Date    Benign essential HTN 9/25/2022    Cardiomyopathy ([facility] [address]     Diabetes mellitus ([facility] [address]     GERD (gastroesophageal reflux disease)     WPW (Dorothy-Parkinson-White syndrome)      Patient Active Problem List   Diagnosis    Type 2 diabetes mellitus with diabetic polyneuropathy, without long-term current use of insulin ([facility])    Acute hyperglycemia    LBBB (left bundle branch block) Elevated BP without diagnosis of hypertension    Peripheral neuropathy    Hyponatremia    Hypokalemia    Abnormal cardiovascular stress test    Non-ischemic cardiomyopathy (HCC)    Umbilical hernia    Anxiety    Hypoglycemia       PE  Vitals:    02/06/23 1038   BP: 132/78   Site: Left Upper Arm   Position: Sitting   Cuff Size: Medium Adult   Pulse: 61   Resp: 16   SpO2: 99%   Weight: 171 lb (77.6 kg)   Height: 6' 1\" (1.854 m)     Estimated body mass index is 22.56 kg/m² as calculated from the following:    Height as of this encounter: 6' 1\" (1.854 m). Weight as of this encounter: 171 lb (77.6 kg). Physical Exam  Vitals and nursing note reviewed. Constitutional:       General: He is not in acute distress. Appearance: Normal appearance. He is normal weight. He is not ill-appearing. HENT:      Head: Normocephalic and atraumatic. Right Ear: External ear normal.      Left Ear: External ear normal.   Cardiovascular:      Rate and Rhythm: Normal rate and regular rhythm. Pulses: Normal pulses. Heart sounds: Normal heart sounds. Pulmonary:      Effort: Pulmonary effort is normal.      Breath sounds: Normal breath sounds. Musculoskeletal:      Cervical back: Normal range of motion and neck supple. No tenderness. Right foot: Normal range of motion. Left foot: Normal range of motion. Feet:      Right foot:      Protective Sensation: 7 sites tested. 3 sites sensed. Skin integrity: Skin integrity normal.      Toenail Condition: Right toenails are abnormally thick. Left foot:      Protective Sensation: 7 sites tested. 5 sites sensed. Skin integrity: Skin integrity normal.      Toenail Condition: Left toenails are abnormally thick. Lymphadenopathy:      Cervical: No cervical adenopathy. Neurological:      General: No focal deficit present. Mental Status: He is alert and oriented to person, place, and time. Mental status is at baseline. Motor: No weakness. Psychiatric:         Mood and Affect: Mood normal.         Behavior: Behavior normal.         Thought Content: Thought content normal.         Judgment: Judgment normal.   Visual inspection:  Deformity/amputation: no amputation but with jatinder prominent non-TTP lateral 5th MTP bony protrusion and another approx 4cm on R dorsum  Skin lesions/pre-ulcerative calluses: jatinder anterior shins with diabetes venous stasis without open wounds  Edema: right- negative, left- negative    Sensory exam:  Monofilament sensation: abnormal - jatinder R>L  (minimum of 5 random plantar locations tested, avoiding callused areas - > 1 area with absence of sensation is + for neuropathy)    Plus at least one of the following:  Pulses: abnormal - warm extremities but pulses decreased. Pinprick: N/A  Proprioception: N/A  Vibration (128 Hz): N/A       Dena Uy, DO    This dictation was generated by voice recognition computer software. Although all attempts are made to edit the dictation for accuracy, there may be errors in the transcription that are not intended.

## 2023-02-07 ENCOUNTER — OFFICE VISIT (OUTPATIENT)
Dept: CARDIOLOGY CLINIC | Age: 53
End: 2023-02-07
Payer: COMMERCIAL

## 2023-02-07 VITALS
WEIGHT: 173 LBS | OXYGEN SATURATION: 100 % | BODY MASS INDEX: 22.93 KG/M2 | SYSTOLIC BLOOD PRESSURE: 129 MMHG | HEIGHT: 73 IN | DIASTOLIC BLOOD PRESSURE: 74 MMHG | HEART RATE: 70 BPM

## 2023-02-07 DIAGNOSIS — I42.8 NON-ISCHEMIC CARDIOMYOPATHY (HCC): Primary | ICD-10-CM

## 2023-02-07 DIAGNOSIS — I10 PRIMARY HYPERTENSION: ICD-10-CM

## 2023-02-07 DIAGNOSIS — I44.7 LBBB (LEFT BUNDLE BRANCH BLOCK): ICD-10-CM

## 2023-02-07 LAB
A/G RATIO: 2 (ref 1.1–2.2)
ALBUMIN SERPL-MCNC: 4.5 G/DL (ref 3.4–5)
ALP BLD-CCNC: 108 U/L (ref 40–129)
ALT SERPL-CCNC: 24 U/L (ref 10–40)
ANION GAP SERPL CALCULATED.3IONS-SCNC: 13 MMOL/L (ref 3–16)
AST SERPL-CCNC: 21 U/L (ref 15–37)
BILIRUB SERPL-MCNC: 0.3 MG/DL (ref 0–1)
BUN BLDV-MCNC: 14 MG/DL (ref 7–20)
CALCIUM SERPL-MCNC: 10 MG/DL (ref 8.3–10.6)
CHLORIDE BLD-SCNC: 102 MMOL/L (ref 99–110)
CO2: 28 MMOL/L (ref 21–32)
CREAT SERPL-MCNC: 0.8 MG/DL (ref 0.9–1.3)
GFR SERPL CREATININE-BSD FRML MDRD: >60 ML/MIN/{1.73_M2}
GLUCOSE BLD-MCNC: 139 MG/DL (ref 70–99)
POTASSIUM SERPL-SCNC: 4.1 MMOL/L (ref 3.5–5.1)
SODIUM BLD-SCNC: 143 MMOL/L (ref 136–145)
TOTAL PROTEIN: 6.7 G/DL (ref 6.4–8.2)

## 2023-02-07 PROCEDURE — 3074F SYST BP LT 130 MM HG: CPT | Performed by: INTERNAL MEDICINE

## 2023-02-07 PROCEDURE — 99214 OFFICE O/P EST MOD 30 MIN: CPT | Performed by: INTERNAL MEDICINE

## 2023-02-07 PROCEDURE — 3078F DIAST BP <80 MM HG: CPT | Performed by: INTERNAL MEDICINE

## 2023-02-07 NOTE — PROGRESS NOTES
[address] [facility]   Cardiovascular Evaluation    PATIENT: Frances York  DATE: 2023  MRN: [id number]  CSN: [id number]  : 1970    Primary Care Doctor/Referring provider: Collette Wilkerson DO, No admitting provider for patient encounter. Reason for evaluation/Chief complaint:   Follow-up and Cardiomyopathy      Subjective:    History of present illness on initial date of evaluation:   Frances York is a 45 y.o. patient who presents for cardiology follow up. He has a past medical history including left bundle branch block, non-ischemic cardiomyopathy, and WPW. On 2022 stress test showed large, severe, fixed, basal to mid inferior/ inferoseptal perfusion defect with associated hypokinesis. On 2022 LHC showed normal coronaries. Echo showed an EF of 35-40% with hypokinesis of the basal inferior, inferior, and inferolateral walls. Today he states he is doing well. Patient currently denies any weight gain, edema, palpitations, chest pain, shortness of breath, dizziness, and syncope. He is taking medications as prescribed, tolerating well. Patient Active Problem List   Diagnosis    Type 2 diabetes mellitus with diabetic polyneuropathy, without long-term current use of insulin (HCC)    Acute hyperglycemia    LBBB (left bundle branch block)    Elevated BP without diagnosis of hypertension    Peripheral neuropathy    Primary hypertension    Hyponatremia    Hypokalemia    Abnormal cardiovascular stress test    Non-ischemic cardiomyopathy ([address]    Umbilical hernia    Anxiety    Hypoglycemia         Cardiac Testing: I have reviewed the findings below. EKG:  ECHO:   STRESS TEST:  CATH:  BYPASS:  VASCULAR:    Past Medical History:   has a past medical history of Benign essential HTN, Cardiomyopathy ([address] Diabetes mellitus ([address] GERD (gastroesophageal reflux disease), and WPW (Dorothy-Parkinson-White syndrome).     Surgical History:   has a past surgical history that includes hernia repair. Social History:   reports that he has never smoked. He has never used smokeless tobacco. He reports that he does not drink alcohol and does not use drugs. Family History:  No evidence for sudden cardiac death or premature CAD    Medications:  Reviewed and are listed in nursing record. and/or listed below  Outpatient Medications:  Prior to Admission medications    Medication Sig Start Date End Date Taking? Authorizing Provider   Ferrous Sulfate (IRON PO) Take by mouth   Yes Historical Provider, MD   Cholecalciferol (VITAMIN D3 PO) Take by mouth   Yes Historical Provider, MD   DULoxetine (CYMBALTA) 60 MG extended release capsule TAKE 1 CAPSULE BY MOUTH EVERY DAY 2/6/23  Yes Dena Murphy DO   insulin glargine-yfgn (SEMGLEE, YFGN,) 100 UNIT/ML injection vial Inject 10 Units into the skin at bedtime 2/6/23 3/8/23 Yes Dena Murphy DO   atorvastatin (LIPITOR) 40 MG tablet TAKE 1 TABLET BY MOUTH NIGHTLY 1/31/23  Yes FAINA Hamm CNP   metoprolol succinate (TOPROL XL) 25 MG extended release tablet TAKE 1 TABLET BY MOUTH EVERY DAY 1/31/23  Yes FAINA Hamm CNP   TRUEPLUS 5-BEVEL PEN NEEDLES 31G X 6 MM MISC USE AS DIRECTED TO INJECT INSULIN NIGHTLY 1/12/23  Yes FAINA Alva CNP   Alcohol Swabs (B-D SINGLE USE SWABS REGULAR) PADS  9/29/22  Yes Historical Provider, MD   Blood Glucose Monitoring Suppl (Donna OGLESBY) w/Device KIT  9/29/22  Yes Historical Provider, MD   Lancets ([address]) [facility]  9/29/22  Yes Historical Provider, MD   vitamin B-12 (CYANOCOBALAMIN) 1000 MCG tablet Take 1 tablet by mouth daily 1/6/23  Yes Dena Murphy DO   glucose ([facility] GLUCOSE) 4 g chewable tablet Take 4 tablets by mouth as needed for Low blood sugar 1/6/23  Yes Dena Murphy DO   blood glucose monitor strips Test 4 times a day & as needed for symptoms of irregular blood glucose.  Dispense sufficient amount for indicated testing frequency plus additional to accommodate PRN testing needs. 1/6/23  Yes Dena Murphy, DO   aspirin 81 MG EC tablet Take 1 tablet by mouth daily 9/29/22  Yes FAINA Nam CNP   lisinopril (PRINIVIL;ZESTRIL) 5 MG tablet Take 1 tablet by mouth daily 9/29/22  Yes FAINA Nam CNP   metFORMIN (GLUCOPHAGE-XR) 500 MG extended release tablet Take 2 tablets by mouth in the morning and at bedtime 1/6/23 2/6/23  Dena Murphy DO       In-patient schedule medications:        Infusion Medications: Allergies:  Penicillins and Amoxicillin     Review of Systems:   All 14 point review of symptoms completed. Pertinent positives identified in the HPI, all other review of symptoms findings as below.      Review of Systems - History obtained from the patient  General ROS: negative for - chills, fever or night sweats  Psychological ROS: negative for - disorientation or hallucinations  Ophthalmic ROS: negative for - dry eyes, eye pain or loss of vision  ENT ROS: negative for - nasal discharge or sore throat  Allergy and Immunology ROS: negative for - hives or itchy/watery eyes  Hematological and Lymphatic ROS: negative for - jaundice or night sweats  Endocrine ROS: negative for - mood swings or temperature intolerance  Breast ROS: deferred  Respiratory ROS: negative for - hemoptysis or stridor  Gastrointestinal ROS: no abdominal pain, change in bowel habits, or black or bloody stools  Genito-Urinary ROS: no dysuria, trouble voiding, or hematuria  Musculoskeletal ROS: negative for - gait disturbance, joint pain or joint stiffness  Neurological ROS: negative for - seizures or speech problems  Dermatological ROS: negative for - rash or skin lesion changes      Physical Examination:    /74 (Site: Left Upper Arm, Position: Sitting)   Pulse 70   Ht 6' 1\" (1.854 m)   Wt 173 lb (78.5 kg)   SpO2 100%   BMI 22.82 kg/m²   /74 (Site: Left Upper Arm, Position: Sitting)   Pulse 70   Ht 6' 1\" (1.854 m)   Wt 173 lb (78.5 kg)   SpO2 100%   BMI 22.82 kg/m²    Weight: 173 lb (78.5 kg)     Wt Readings from Last 3 Encounters:   02/07/23 173 lb (78.5 kg)   02/06/23 171 lb (77.6 kg)   01/06/23 168 lb (76.2 kg)     No intake or output data in the 24 hours ending 02/07/23 1031    General Appearance:  Alert, cooperative, no distress, appears stated age   Head:  Normocephalic, without obvious abnormality, atraumatic   Eyes:  PERRL, conjunctiva/corneas clear       Nose: Nares normal, no drainage or sinus tenderness   Throat: Lips, mucosa, and tongue normal   Neck: Supple, symmetrical, trachea midline, no adenopathy, thyroid: not enlarged, symmetric, no tenderness/mass/nodules, no carotid bruit or JVD       Lungs:   Clear to auscultation bilaterally, respirations unlabored   Chest Wall:  No tenderness or deformity   Heart:  Regular rhythm and normal rate; S1, S2 are normal; no murmur noted; no rub or gallop   Abdomen:   Soft, non-tender, bowel sounds active all four quadrants,  no masses, no organomegaly           Extremities: Extremities normal, atraumatic, no cyanosis or edema   Pulses: 2+ and symmetric   Skin: Skin color, texture, turgor normal, no rashes or lesions   Pysch: Normal mood and affect   Neurologic: Normal gross motor and sensory exam.         Labs  Recent Labs     02/06/23  1206   WBC 7.2   HGB 13.3*   HCT 40.5   MCV 93.8        Recent Labs     02/06/23  1206   CREATININE 0.8*   BUN 14      K 4.1      CO2 28     No results for input(s): INR, PROTIME in the last 72 hours.  No results for input(s): TROPONINI in the last 72 hours.  Invalid input(s): PRO-BNP  No results for input(s): CHOL, LDL, HDL in the last 72 hours.    Invalid input(s): TG      Imaging:  I have reviewed the below testing personally and my interpretation is below.  EKG:  CXR:      Assessment:  52 y.o. patient with:  Active Problems:    * No active hospital problems. *  Resolved Problems:    * No resolved hospital problems. *      Problem List Items Addressed This Visit       LBBB (left bundle  branch block)    Primary hypertension    Non-ischemic cardiomyopathy ([facility] [address] - Primary       Plan:  1. Proceed with cardiac MRI ~ non-ischemic cardiomyopathy   2. Referral to Dr.Lynne Green ~ non-ischemic cardiomyopathy    ~entresto escalation   3. I recommend that the patient continue their currently prescribed medications. Their drug modifiable risk factors appear to be well controlled. I will continue to address the need/dosing of medications in future visits. 4. Follow up with interventional as needed      Scribe's attestation: This note was scribed in the presence of Carl Morales by Danitza Johnson RN       I, Dr. Sherry Weber, personally performed the services described in this documentation, as scribed by the above signed scribe in my presence. It is both accurate and complete to my knowledge. I agree with the details independently gathered by the clinical support staff, while the remaining scribed note accurately describes my personal service to the patient. Medical Decision Making: The following items were considered in medical decision making:  Independent review of images  Review / order clinical lab tests  Review / order radiology tests  Decision to obtain old records  Review and summation of old records as accessed through [facility] (a summary of my findings in these old records)      Time Based Itemization  A total of 30 minutes was spent on today's patient encounter. If applicable, non-patient-facing activities:  (X)Preparing to see the patient and reviewing records  (X) Individual interpretation of results  ( ) Discussion or coordination of care with other health care professionals  () Ordering of unique tests, medications, or procedures  (X) Documentation within the EHR              All questions and concerns were addressed to the patient/family. Alternatives to my treatment were discussed. The note was completed using EMR.  Every effort was made to ensure accuracy; however, inadvertent computerized transcription errors may be present.     Cherelle Reeves MD, [address], [place], [place]  [phone] [facility] office  [phone] [facility]  2/7/2023  10:31 AM

## 2023-02-12 LAB — GLUTAMIC ACID DECARB AB: <5 IU/ML (ref 0–5)

## 2023-02-21 NOTE — TELEPHONE ENCOUNTER
LOV 2/6/2023    Future Appointments   Date Time Provider Lorena Jansen   3/6/2023 11:00 AM Dena OBWEN [place] [facility]   3/28/2023 12:15 PM Altamease Lennox, MD [facility]   5/9/2023 10:45 AM MD Shannan Hutchinson

## 2023-02-22 RX ORDER — LISINOPRIL 5 MG/1
TABLET ORAL
Qty: 90 TABLET | Refills: 1 | Status: SHIPPED | OUTPATIENT
Start: 2023-02-22

## 2023-02-22 RX ORDER — ASPIRIN 81 MG/1
TABLET, COATED ORAL
Qty: 30 TABLET | Refills: 4 | Status: SHIPPED | OUTPATIENT
Start: 2023-02-22

## 2023-03-01 DIAGNOSIS — E11.42 TYPE 2 DIABETES MELLITUS WITH DIABETIC POLYNEUROPATHY, WITHOUT LONG-TERM CURRENT USE OF INSULIN (HCC): ICD-10-CM

## 2023-03-01 DIAGNOSIS — G62.9 PERIPHERAL POLYNEUROPATHY: ICD-10-CM

## 2023-03-01 RX ORDER — METFORMIN HYDROCHLORIDE 500 MG/1
1000 TABLET, EXTENDED RELEASE ORAL 2 TIMES DAILY
Qty: 120 TABLET | Refills: 1 | Status: SHIPPED | OUTPATIENT
Start: 2023-03-01 | End: 2023-03-31

## 2023-03-06 ENCOUNTER — OFFICE VISIT (OUTPATIENT)
Dept: FAMILY MEDICINE CLINIC | Age: 53
End: 2023-03-06
Payer: COMMERCIAL

## 2023-03-06 VITALS
HEIGHT: 73 IN | BODY MASS INDEX: 22.66 KG/M2 | DIASTOLIC BLOOD PRESSURE: 64 MMHG | SYSTOLIC BLOOD PRESSURE: 118 MMHG | HEART RATE: 78 BPM | WEIGHT: 171 LBS | OXYGEN SATURATION: 98 %

## 2023-03-06 DIAGNOSIS — Z79.4 TYPE 2 DIABETES MELLITUS WITH DIABETIC POLYNEUROPATHY, WITH LONG-TERM CURRENT USE OF INSULIN (HCC): Primary | ICD-10-CM

## 2023-03-06 DIAGNOSIS — E11.42 TYPE 2 DIABETES MELLITUS WITH DIABETIC POLYNEUROPATHY, WITH LONG-TERM CURRENT USE OF INSULIN (HCC): Primary | ICD-10-CM

## 2023-03-06 DIAGNOSIS — I50.22 REDUCED EJECTION FRACTION CONCURRENT WITH AND DUE TO CHRONIC HEART FAILURE (HCC): ICD-10-CM

## 2023-03-06 DIAGNOSIS — I42.8 NON-ISCHEMIC CARDIOMYOPATHY (HCC): ICD-10-CM

## 2023-03-06 DIAGNOSIS — F41.9 ANXIETY: ICD-10-CM

## 2023-03-06 PROCEDURE — 3046F HEMOGLOBIN A1C LEVEL >9.0%: CPT | Performed by: STUDENT IN AN ORGANIZED HEALTH CARE EDUCATION/TRAINING PROGRAM

## 2023-03-06 PROCEDURE — 3078F DIAST BP <80 MM HG: CPT | Performed by: STUDENT IN AN ORGANIZED HEALTH CARE EDUCATION/TRAINING PROGRAM

## 2023-03-06 PROCEDURE — 99214 OFFICE O/P EST MOD 30 MIN: CPT | Performed by: STUDENT IN AN ORGANIZED HEALTH CARE EDUCATION/TRAINING PROGRAM

## 2023-03-06 PROCEDURE — 3074F SYST BP LT 130 MM HG: CPT | Performed by: STUDENT IN AN ORGANIZED HEALTH CARE EDUCATION/TRAINING PROGRAM

## 2023-03-06 NOTE — PROGRESS NOTES
[facility]  3/6/2023    Iwona Childers (:  1970) is a 45 y.o. male, here for evaluation of the following medical concerns:    Chief Complaint   Patient presents with    Follow-up    Diabetes    Jaw Pain     Right side pain        ASSESSMENT/ PLAN  1. Type 2 diabetes mellitus with diabetic polyneuropathy, with long-term current use of insulin (HCC)  Pt with improved hypoglycemic episodes although reports one-time syncopal episodes with no CV prodrome. Suspect orthostatic hypotension. Patient is already following with cardiology with recent referral to interventional cardiologist.  Patient has appointment with them may 2023. Has follow-up with cardiology again on 3/28/2023. Given hypoglycemic episodes and in consideration of his cardiovascular risk, we will transition from insulin to SGLT2.  - dapagliflozin (FARXIGA) 5 MG tablet; Take 1 tablet by mouth every morning  Dispense: 30 tablet; Refill: 0    2. Non-ischemic cardiomyopathy ([address]  Likely underlying heart failure as last NM stress test shows reduced EF with wall motion abnormality. Patient is following closely with cardiology. Reviewed cardiology notes and it looks like they are thinking of starting patient on Entresto. - dapagliflozin (FARXIGA) 5 MG tablet; Take 1 tablet by mouth every morning  Dispense: 30 tablet; Refill: 0    3. Reduced ejection fraction concurrent with and due to chronic heart failure (HCC)  As above  - dapagliflozin (FARXIGA) 5 MG tablet; Take 1 tablet by mouth every morning  Dispense: 30 tablet; Refill: 0    4. Anxiety  Stable. Patient reports doing well with Cymbalta that also helps with his peripheral neuropathy    Total Time Spent with Patient: 30 minutes, of which greater than 50% was spent on counseling or coordinating care. Return in about 4 weeks (around 4/3/2023) for T2DM and syncope. Education provided regarding visit today.  See visit diagnoses, orders and follow up recommendations. Portions of record reviewed for pertinent issues: active problem list,medication list,allergies,social history, health maintenance. Discussed probable diagnosis, test results if available in office today and management options with patient: patient agreed to a medical plan. Will contact patient for results. HPI    Type 2 DM  - Last seen by me 2/6/2023 with patient reported hypoglycemic episodes while patient is on max dose of metformin and Lantus 15 at bedtime. We discussed de-escalating his Lantus from 15 units to 10 units and check NISHA antibodies for Lori. NISHA antibodies negative. Patient already referred to ophthalmology for eye exam and patient referred to podiatry after diabetic foot exam last visit given thick nails for further diabetic foot care  Patient takes:   Current Outpatient Medications   Medication Sig Dispense Refill    metFORMIN (GLUCOPHAGE-XR) 500 MG extended release tablet TAKE 2 TABLETS BY MOUTH IN THE MORNING AND AT BEDTIME 120 tablet 1    insulin glargine-yfgn (SEMGLEE, YFGN,) 100 UNIT/ML injection vial Inject 10 Units into the skin at bedtime 10 mL 0     Home blood sugars - 110s-170s,184-281  Compliance: yes  Neuropathy: yes  The patient denies polyuria, polydipsia, nocturia, blurred vision  Frequency of Hypoglycemia -  once in 2 weeks, 53 with 500s blood sugar that same day   Screening  - Optho: already referred. Pt has yet to make appt  - Foot exam: performed last visit but already referred.  Pt has yet to make appt  - Last HbA1c   Hemoglobin A1C   Date Value Ref Range Status   01/06/2023 9.2 % Final     - Last Creatinine    Lab Results   Component Value Date    CREATININE 0.8 (L) 02/06/2023       - Last Urine Microalbumin  Lab Results   Component Value Date    LABMICR 1.50 01/06/2023        - Last lipids     Lab Results   Component Value Date    CHOL 206 (H) 09/26/2022    CHOL 111 03/04/2010     Lab Results   Component Value Date    TRIG 199 (H) 09/26/2022    TRIG 120 03/04/2010     Lab Results   Component Value Date    HDL 40 09/26/2022    HDL 22 (L) 03/04/2010     Lab Results   Component Value Date    LABVLDL 40 09/26/2022    LABVLDL 24 03/04/2010     Lab Results   Component Value Date    LDLCALC 126 (H) 09/26/2022      - Last LFT's   Lab Results   Component Value Date    ALT 24 02/06/2023    AST 21 02/06/2023    ALKPHOS 108 02/06/2023    BILITOT 0.3 02/06/2023        - blood pressure      BP Readings from Last 10 Encounters:   03/06/23 118/64   02/07/23 129/74   02/06/23 132/78   01/06/23 122/66   11/21/22 116/70   10/13/22 110/66   09/28/22 120/79   05/16/17 132/88        Anxiety   -Last NISHA-7 score 15 consistent with severe anxiety, score similar to prior.   During prior visits, we initiated Cymbalta for patient to treat both anxiety and diabetic neuropathy    Syncope  - \"a few seconds\", \"felt dizzy\" while lifting at work, immediately got up,  - reports intermittent chest pain, SOB  - denies chest pain, palpitations, SOB the time    ROS  Review of Systems    HISTORIES  Current Outpatient Medications on File Prior to Visit   Medication Sig Dispense Refill    metFORMIN (GLUCOPHAGE-XR) 500 MG extended release tablet TAKE 2 TABLETS BY MOUTH IN THE MORNING AND AT BEDTIME 120 tablet 1    ASPIRIN LOW DOSE 81 MG EC tablet TAKE 1 TABLET BY MOUTH EVERY DAY 30 tablet 4    lisinopril (PRINIVIL;ZESTRIL) 5 MG tablet TAKE 1 TABLET BY MOUTH EVERY DAY 90 tablet 1    Ferrous Sulfate (IRON PO) Take by mouth      Cholecalciferol (VITAMIN D3 PO) Take by mouth      DULoxetine (CYMBALTA) 60 MG extended release capsule TAKE 1 CAPSULE BY MOUTH EVERY DAY 90 capsule 0    atorvastatin (LIPITOR) 40 MG tablet TAKE 1 TABLET BY MOUTH NIGHTLY 30 tablet 3    metoprolol succinate (TOPROL XL) 25 MG extended release tablet TAKE 1 TABLET BY MOUTH EVERY DAY 90 tablet 1    TRUEPLUS 5-BEVEL PEN NEEDLES 31G X 6 MM MISC USE AS DIRECTED TO INJECT INSULIN NIGHTLY 100 each 2    Alcohol Swabs (B-D SINGLE USE SWABS REGULAR) PADS       Blood Glucose Monitoring Suppl (ONETOUCH VERIO REFLECT) w/Device KIT       Lancets (ONETOUCH DELICA PLUS [id number]) MISC       vitamin B-12 (CYANOCOBALAMIN) 1000 MCG tablet Take 1 tablet by mouth daily 30 tablet 3    blood glucose monitor strips Test 4 times a day & as needed for symptoms of irregular blood glucose. Dispense sufficient amount for indicated testing frequency plus additional to accommodate PRN testing needs. 124 strip 1     No current facility-administered medications on file prior to visit. Past Medical History:   Diagnosis Date    Benign essential HTN 9/25/2022    Cardiomyopathy ([facility] [address]     Diabetes mellitus ([facility] [address]     GERD (gastroesophageal reflux disease)     WPW (Dorothy-Parkinson-White syndrome)      Patient Active Problem List   Diagnosis    Type 2 diabetes mellitus with diabetic polyneuropathy, without long-term current use of insulin (HCC)    Acute hyperglycemia    LBBB (left bundle branch block)    Elevated BP without diagnosis of hypertension    Peripheral neuropathy    Primary hypertension    Hyponatremia    Hypokalemia    Abnormal cardiovascular stress test    Non-ischemic cardiomyopathy (HCC)    Umbilical hernia    Anxiety    Hypoglycemia       PE  Vitals:    03/06/23 1045   BP: 118/64   Pulse: 78   SpO2: 98%   Weight: 171 lb (77.6 kg)   Height: 6' 1\" (1.854 m)     Estimated body mass index is 22.56 kg/m² as calculated from the following:    Height as of this encounter: 6' 1\" (1.854 m). Weight as of this encounter: 171 lb (77.6 kg). Physical Exam  Vitals and nursing note reviewed. Constitutional:       General: He is not in acute distress. Appearance: Normal appearance. He is normal weight. He is not ill-appearing. HENT:      Head: Normocephalic and atraumatic. Right Ear: External ear normal.      Left Ear: External ear normal.   Cardiovascular:      Rate and Rhythm: Normal rate and regular rhythm. Pulses: Normal pulses.       Heart sounds: Normal heart sounds. Pulmonary:      Effort: Pulmonary effort is normal.      Breath sounds: Normal breath sounds. Musculoskeletal:      Cervical back: Normal range of motion and neck supple. No tenderness. Lymphadenopathy:      Cervical: No cervical adenopathy. Neurological:      General: No focal deficit present. Mental Status: He is alert and oriented to person, place, and time. Mental status is at baseline. Dena Murphy DO    This dictation was generated by voice recognition computer software. Although all attempts are made to edit the dictation for accuracy, there may be errors in the transcription that are not intended.

## 2023-03-14 DIAGNOSIS — E11.42 TYPE 2 DIABETES MELLITUS WITH DIABETIC POLYNEUROPATHY, WITHOUT LONG-TERM CURRENT USE OF INSULIN (HCC): ICD-10-CM

## 2023-03-14 RX ORDER — BLOOD SUGAR DIAGNOSTIC
STRIP MISCELLANEOUS
Qty: 100 STRIP | Refills: 2 | Status: SHIPPED | OUTPATIENT
Start: 2023-03-14

## 2023-03-24 NOTE — PROGRESS NOTES
[address]   Cardiac Consultation  Date: 3/28/23  Patient Name: Eliza Hamm  YOB: 1970    Primary Care Physician: Kane Servin DO    CHIEF COMPLAINT:   Chief Complaint   Patient presents with    Follow-up    Cardiomyopathy    Fatigue     Pt states he has been sleeping more often - worsening over the past few months        HPI:  Eliza Hamm is a 45 y.o. male was admitted on 9/24/2022 with hyperglycemia. EP consulted for WPW and new LBBB. On 9/26/2022 stress test showed large, severe, fixed, basal to mid inferior/ inferoseptal perfusion defect with associated hypokinesis. On 9/27/2022 LHC showed normal coronaries. Echo showed an EF of 35-40% with hypokinesis of the basal inferior, inferior, and inferolateral walls. Rhythm has been SR with intermittent pre-excitation. On 11/21/2022, he reports that he is doing ok. He is active with walking and running and tolerates this well. He is currently working with loading and unloading trucks and has no issues with this. At conclusion of office visit it was recommended patient undergo cardiac MRI which was not completed. Today, 3/28/2023, patient has been feeling fatigued over the last few months. He states he was unable to complete the cardiac MRI due to cost. Patient denies current edema, chest pain, sob, palpitations, dizziness or syncope. Patient is taking all cardiac medications as prescribed and tolerates them well. Past Medical History:   has a past medical history of Benign essential HTN, Cardiomyopathy ([address] Diabetes mellitus ([address] GERD (gastroesophageal reflux disease), and WPW (Dorothy-Parkinson-White syndrome). Surgical History:   has a past surgical history that includes hernia repair. Social History:   reports that he has never smoked. He has never used smokeless tobacco. He reports that he does not drink alcohol and does not use drugs. Family History:  family history includes Diabetes in his father.

## 2023-03-28 ENCOUNTER — TELEPHONE (OUTPATIENT)
Dept: CARDIOLOGY CLINIC | Age: 53
End: 2023-03-28

## 2023-03-28 ENCOUNTER — OFFICE VISIT (OUTPATIENT)
Dept: CARDIOLOGY CLINIC | Age: 53
End: 2023-03-28
Payer: COMMERCIAL

## 2023-03-28 VITALS
HEIGHT: 73 IN | WEIGHT: 167 LBS | OXYGEN SATURATION: 98 % | BODY MASS INDEX: 22.13 KG/M2 | HEART RATE: 76 BPM | DIASTOLIC BLOOD PRESSURE: 62 MMHG | SYSTOLIC BLOOD PRESSURE: 100 MMHG

## 2023-03-28 DIAGNOSIS — E11.42 TYPE 2 DIABETES MELLITUS WITH DIABETIC POLYNEUROPATHY, WITHOUT LONG-TERM CURRENT USE OF INSULIN (HCC): ICD-10-CM

## 2023-03-28 DIAGNOSIS — I49.9 IRREGULAR HEART RATE: Primary | ICD-10-CM

## 2023-03-28 DIAGNOSIS — G62.9 PERIPHERAL POLYNEUROPATHY: ICD-10-CM

## 2023-03-28 DIAGNOSIS — I42.8 NON-ISCHEMIC CARDIOMYOPATHY (HCC): ICD-10-CM

## 2023-03-28 PROCEDURE — 99214 OFFICE O/P EST MOD 30 MIN: CPT | Performed by: INTERNAL MEDICINE

## 2023-03-28 PROCEDURE — 3078F DIAST BP <80 MM HG: CPT | Performed by: INTERNAL MEDICINE

## 2023-03-28 PROCEDURE — 3074F SYST BP LT 130 MM HG: CPT | Performed by: INTERNAL MEDICINE

## 2023-03-28 PROCEDURE — 93000 ELECTROCARDIOGRAM COMPLETE: CPT | Performed by: INTERNAL MEDICINE

## 2023-03-28 RX ORDER — METFORMIN HYDROCHLORIDE 500 MG/1
1000 TABLET, EXTENDED RELEASE ORAL 2 TIMES DAILY
Qty: 120 TABLET | Refills: 1 | Status: SHIPPED | OUTPATIENT
Start: 2023-03-28 | End: 2023-04-27

## 2023-03-28 NOTE — TELEPHONE ENCOUNTER
Last Office Visit  -  3/6/23  Next Office Visit  -  4/14/23    Last Filled  -  3/1/23  Last UDS -    Contract -

## 2023-03-28 NOTE — PATIENT INSTRUCTIONS
Plan:  Recommend proceeding with cardiac MRI as previously ordered. Continue taking current cardiac medications as prescribed. Follow up with Dr. Eliezer Herbert as scheduled 2023. Follow up with EP NP in 6 months.          [facility] [phone]

## 2023-04-01 DIAGNOSIS — E11.42 TYPE 2 DIABETES MELLITUS WITH DIABETIC POLYNEUROPATHY, WITH LONG-TERM CURRENT USE OF INSULIN (HCC): ICD-10-CM

## 2023-04-01 DIAGNOSIS — I50.22 REDUCED EJECTION FRACTION CONCURRENT WITH AND DUE TO CHRONIC HEART FAILURE (HCC): ICD-10-CM

## 2023-04-01 DIAGNOSIS — Z79.4 TYPE 2 DIABETES MELLITUS WITH DIABETIC POLYNEUROPATHY, WITH LONG-TERM CURRENT USE OF INSULIN (HCC): ICD-10-CM

## 2023-04-01 DIAGNOSIS — I42.8 NON-ISCHEMIC CARDIOMYOPATHY (HCC): ICD-10-CM

## 2023-04-03 RX ORDER — DAPAGLIFLOZIN 5 MG/1
TABLET, FILM COATED ORAL
Qty: 30 TABLET | Refills: 0 | Status: SHIPPED | OUTPATIENT
Start: 2023-04-03

## 2023-04-23 DIAGNOSIS — E11.42 TYPE 2 DIABETES MELLITUS WITH DIABETIC POLYNEUROPATHY, WITHOUT LONG-TERM CURRENT USE OF INSULIN (HCC): ICD-10-CM

## 2023-04-23 DIAGNOSIS — G62.9 PERIPHERAL POLYNEUROPATHY: ICD-10-CM

## 2023-04-24 RX ORDER — METFORMIN HYDROCHLORIDE 500 MG/1
1000 TABLET, EXTENDED RELEASE ORAL 2 TIMES DAILY
Qty: 120 TABLET | Refills: 1 | Status: SHIPPED | OUTPATIENT
Start: 2023-04-24 | End: 2023-05-24

## 2023-04-28 DIAGNOSIS — F41.9 ANXIETY: ICD-10-CM

## 2023-04-28 DIAGNOSIS — G62.9 PERIPHERAL POLYNEUROPATHY: ICD-10-CM

## 2023-04-28 DIAGNOSIS — E11.42 TYPE 2 DIABETES MELLITUS WITH DIABETIC POLYNEUROPATHY, WITHOUT LONG-TERM CURRENT USE OF INSULIN (HCC): ICD-10-CM

## 2023-05-01 RX ORDER — DULOXETIN HYDROCHLORIDE 60 MG/1
CAPSULE, DELAYED RELEASE ORAL
Qty: 90 CAPSULE | Refills: 0 | Status: SHIPPED | OUTPATIENT
Start: 2023-05-01

## 2023-05-01 NOTE — TELEPHONE ENCOUNTER
Last Office Visit  -  4/14/23  Next Office Visit  -  7/14/23    Last Filled  -  2/6/23  Last UDS -    Contract -

## 2023-05-04 DIAGNOSIS — E11.42 TYPE 2 DIABETES MELLITUS WITH DIABETIC POLYNEUROPATHY, WITHOUT LONG-TERM CURRENT USE OF INSULIN (HCC): ICD-10-CM

## 2023-05-04 DIAGNOSIS — G62.9 PERIPHERAL POLYNEUROPATHY: ICD-10-CM

## 2023-05-04 RX ORDER — LANOLIN ALCOHOL/MO/W.PET/CERES
CREAM (GRAM) TOPICAL
Qty: 30 TABLET | Refills: 3 | Status: SHIPPED | OUTPATIENT
Start: 2023-05-04

## 2023-05-05 DIAGNOSIS — I42.8 NON-ISCHEMIC CARDIOMYOPATHY (HCC): ICD-10-CM

## 2023-05-05 DIAGNOSIS — Z79.4 TYPE 2 DIABETES MELLITUS WITH DIABETIC POLYNEUROPATHY, WITH LONG-TERM CURRENT USE OF INSULIN (HCC): ICD-10-CM

## 2023-05-05 DIAGNOSIS — E11.42 TYPE 2 DIABETES MELLITUS WITH DIABETIC POLYNEUROPATHY, WITH LONG-TERM CURRENT USE OF INSULIN (HCC): ICD-10-CM

## 2023-05-05 DIAGNOSIS — I50.22 REDUCED EJECTION FRACTION CONCURRENT WITH AND DUE TO CHRONIC HEART FAILURE (HCC): ICD-10-CM

## 2023-05-05 RX ORDER — DAPAGLIFLOZIN 5 MG/1
TABLET, FILM COATED ORAL
Qty: 30 TABLET | Refills: 0 | Status: SHIPPED | OUTPATIENT
Start: 2023-05-05

## 2023-05-09 ENCOUNTER — TELEPHONE (OUTPATIENT)
Dept: CARDIOLOGY CLINIC | Age: 53
End: 2023-05-09

## 2023-05-09 ENCOUNTER — OFFICE VISIT (OUTPATIENT)
Dept: CARDIOLOGY CLINIC | Age: 53
End: 2023-05-09
Payer: COMMERCIAL

## 2023-05-09 VITALS
DIASTOLIC BLOOD PRESSURE: 50 MMHG | HEART RATE: 74 BPM | WEIGHT: 160 LBS | SYSTOLIC BLOOD PRESSURE: 92 MMHG | BODY MASS INDEX: 21.2 KG/M2 | HEIGHT: 73 IN | OXYGEN SATURATION: 98 %

## 2023-05-09 DIAGNOSIS — I42.8 NONISCHEMIC CARDIOMYOPATHY (HCC): ICD-10-CM

## 2023-05-09 DIAGNOSIS — I45.6 WPW (WOLFF-PARKINSON-WHITE SYNDROME): ICD-10-CM

## 2023-05-09 DIAGNOSIS — I44.7 LBBB (LEFT BUNDLE BRANCH BLOCK): ICD-10-CM

## 2023-05-09 DIAGNOSIS — I50.22 SYSTOLIC CHF, CHRONIC (HCC): Primary | ICD-10-CM

## 2023-05-09 DIAGNOSIS — E11.9 NEW ONSET TYPE 2 DIABETES MELLITUS (HCC): ICD-10-CM

## 2023-05-09 PROCEDURE — 99205 OFFICE O/P NEW HI 60 MIN: CPT | Performed by: INTERNAL MEDICINE

## 2023-05-09 PROCEDURE — 3078F DIAST BP <80 MM HG: CPT | Performed by: INTERNAL MEDICINE

## 2023-05-09 PROCEDURE — 3074F SYST BP LT 130 MM HG: CPT | Performed by: INTERNAL MEDICINE

## 2023-05-14 DIAGNOSIS — I50.22 REDUCED EJECTION FRACTION CONCURRENT WITH AND DUE TO CHRONIC HEART FAILURE (HCC): ICD-10-CM

## 2023-05-14 DIAGNOSIS — I42.8 NON-ISCHEMIC CARDIOMYOPATHY (HCC): ICD-10-CM

## 2023-05-14 DIAGNOSIS — E11.42 TYPE 2 DIABETES MELLITUS WITH DIABETIC POLYNEUROPATHY, WITH LONG-TERM CURRENT USE OF INSULIN (HCC): ICD-10-CM

## 2023-05-14 DIAGNOSIS — Z79.4 TYPE 2 DIABETES MELLITUS WITH DIABETIC POLYNEUROPATHY, WITH LONG-TERM CURRENT USE OF INSULIN (HCC): ICD-10-CM

## 2023-05-15 RX ORDER — DAPAGLIFLOZIN 10 MG/1
TABLET, FILM COATED ORAL
Qty: 90 TABLET | Refills: 1 | OUTPATIENT
Start: 2023-05-15

## 2023-05-15 RX ORDER — ATORVASTATIN CALCIUM 40 MG/1
40 TABLET, FILM COATED ORAL NIGHTLY
Qty: 90 TABLET | Refills: 1 | Status: SHIPPED | OUTPATIENT
Start: 2023-05-15

## 2023-05-20 DIAGNOSIS — E11.42 TYPE 2 DIABETES MELLITUS WITH DIABETIC POLYNEUROPATHY, WITHOUT LONG-TERM CURRENT USE OF INSULIN (HCC): ICD-10-CM

## 2023-05-20 DIAGNOSIS — G62.9 PERIPHERAL POLYNEUROPATHY: ICD-10-CM

## 2023-05-22 RX ORDER — METFORMIN HYDROCHLORIDE 500 MG/1
1000 TABLET, EXTENDED RELEASE ORAL 2 TIMES DAILY
Qty: 120 TABLET | Refills: 1 | Status: SHIPPED | OUTPATIENT
Start: 2023-05-22 | End: 2023-06-21

## 2023-05-25 RX ORDER — ASPIRIN 81 MG/1
TABLET, COATED ORAL
Qty: 90 TABLET | Refills: 1 | Status: SHIPPED | OUTPATIENT
Start: 2023-05-25

## 2023-06-04 DIAGNOSIS — E11.42 TYPE 2 DIABETES MELLITUS WITH DIABETIC POLYNEUROPATHY, WITHOUT LONG-TERM CURRENT USE OF INSULIN (HCC): ICD-10-CM

## 2023-06-05 RX ORDER — BLOOD SUGAR DIAGNOSTIC
STRIP MISCELLANEOUS
Qty: 100 STRIP | Refills: 2 | Status: SHIPPED | OUTPATIENT
Start: 2023-06-05

## 2023-06-06 ENCOUNTER — HOSPITAL ENCOUNTER (OUTPATIENT)
Age: 53
Discharge: HOME | End: 2023-06-06
Payer: COMMERCIAL

## 2023-06-06 DIAGNOSIS — I42.8 NONISCHEMIC CARDIOMYOPATHY (HCC): ICD-10-CM

## 2023-06-06 DIAGNOSIS — I50.22 SYSTOLIC CHF, CHRONIC (HCC): ICD-10-CM

## 2023-06-06 LAB
ANION GAP SERPL CALCULATED.3IONS-SCNC: 15 MMOL/L (ref 3–16)
BUN SERPL-MCNC: 11 MG/DL (ref 7–20)
CALCIUM SERPL-MCNC: 9.4 MG/DL (ref 8.3–10.6)
CHLORIDE SERPL-SCNC: 98 MMOL/L (ref 99–110)
CO2 SERPL-SCNC: 23 MMOL/L (ref 21–32)
CREAT SERPL-MCNC: 0.9 MG/DL (ref 0.9–1.3)
GFR SERPLBLD CREATININE-BSD FMLA CKD-EPI: >60 ML/MIN/{1.73_M2}
GLUCOSE SERPL-MCNC: 320 MG/DL (ref 70–99)
NT-PROBNP SERPL-MCNC: 82 PG/ML (ref 0–124)
POTASSIUM SERPL-SCNC: 3.7 MMOL/L (ref 3.5–5.1)
SODIUM SERPL-SCNC: 136 MMOL/L (ref 136–145)
TSH SERPL DL<=0.005 MIU/L-ACNC: 0.74 UIU/ML (ref 0.27–4.2)

## 2023-06-06 PROCEDURE — 80048 BASIC METABOLIC PNL TOTAL CA: CPT

## 2023-06-06 PROCEDURE — 83880 ASSAY OF NATRIURETIC PEPTIDE: CPT

## 2023-06-06 PROCEDURE — 84443 ASSAY THYROID STIM HORMONE: CPT

## 2023-06-06 PROCEDURE — 36415 COLL VENOUS BLD VENIPUNCTURE: CPT

## 2023-06-08 ENCOUNTER — COMMUNITY OUTREACH (OUTPATIENT)
Dept: FAMILY MEDICINE CLINIC | Age: 53
End: 2023-06-08

## 2023-07-14 ENCOUNTER — OFFICE VISIT (OUTPATIENT)
Dept: FAMILY MEDICINE CLINIC | Age: 53
End: 2023-07-14
Payer: COMMERCIAL

## 2023-07-14 VITALS
DIASTOLIC BLOOD PRESSURE: 60 MMHG | OXYGEN SATURATION: 99 % | WEIGHT: 162.4 LBS | BODY MASS INDEX: 21.52 KG/M2 | SYSTOLIC BLOOD PRESSURE: 120 MMHG | HEIGHT: 73 IN | HEART RATE: 68 BPM

## 2023-07-14 DIAGNOSIS — E11.42 TYPE 2 DIABETES MELLITUS WITH DIABETIC POLYNEUROPATHY, WITHOUT LONG-TERM CURRENT USE OF INSULIN (HCC): Primary | ICD-10-CM

## 2023-07-14 DIAGNOSIS — R53.83 OTHER FATIGUE: ICD-10-CM

## 2023-07-14 DIAGNOSIS — N52.9 ERECTILE DYSFUNCTION, UNSPECIFIED ERECTILE DYSFUNCTION TYPE: ICD-10-CM

## 2023-07-14 DIAGNOSIS — F41.9 ANXIETY: ICD-10-CM

## 2023-07-14 DIAGNOSIS — G62.9 PERIPHERAL POLYNEUROPATHY: ICD-10-CM

## 2023-07-14 LAB — HBA1C MFR BLD: 8.8 %

## 2023-07-14 PROCEDURE — 3074F SYST BP LT 130 MM HG: CPT | Performed by: STUDENT IN AN ORGANIZED HEALTH CARE EDUCATION/TRAINING PROGRAM

## 2023-07-14 PROCEDURE — 99214 OFFICE O/P EST MOD 30 MIN: CPT | Performed by: STUDENT IN AN ORGANIZED HEALTH CARE EDUCATION/TRAINING PROGRAM

## 2023-07-14 PROCEDURE — 83037 HB GLYCOSYLATED A1C HOME DEV: CPT | Performed by: STUDENT IN AN ORGANIZED HEALTH CARE EDUCATION/TRAINING PROGRAM

## 2023-07-14 PROCEDURE — 3052F HG A1C>EQUAL 8.0%<EQUAL 9.0%: CPT | Performed by: STUDENT IN AN ORGANIZED HEALTH CARE EDUCATION/TRAINING PROGRAM

## 2023-07-14 PROCEDURE — 3078F DIAST BP <80 MM HG: CPT | Performed by: STUDENT IN AN ORGANIZED HEALTH CARE EDUCATION/TRAINING PROGRAM

## 2023-07-14 RX ORDER — SEMAGLUTIDE 1.34 MG/ML
INJECTION, SOLUTION SUBCUTANEOUS
Qty: 3 ADJUSTABLE DOSE PRE-FILLED PEN SYRINGE | Refills: 0 | Status: SHIPPED | OUTPATIENT
Start: 2023-07-14 | End: 2023-09-08

## 2023-07-14 RX ORDER — METFORMIN HYDROCHLORIDE 500 MG/1
1000 TABLET, EXTENDED RELEASE ORAL 2 TIMES DAILY
Qty: 120 TABLET | Refills: 5 | Status: SHIPPED | OUTPATIENT
Start: 2023-07-14 | End: 2024-01-10

## 2023-07-14 RX ORDER — DULOXETIN HYDROCHLORIDE 60 MG/1
60 CAPSULE, DELAYED RELEASE ORAL DAILY
Qty: 30 CAPSULE | Refills: 5 | Status: SHIPPED | OUTPATIENT
Start: 2023-07-14

## 2023-07-14 NOTE — PROGRESS NOTES
Physical Exam  Vitals reviewed. Constitutional:       General: He is not in acute distress. Appearance: Normal appearance. He is not ill-appearing, toxic-appearing or diaphoretic. HENT:      Mouth/Throat:      Mouth: Mucous membranes are moist.   Eyes:      General: No scleral icterus. Conjunctiva/sclera: Conjunctivae normal.   Cardiovascular:      Rate and Rhythm: Normal rate. Pulmonary:      Effort: Pulmonary effort is normal.   Abdominal:      General: There is no distension. Musculoskeletal:      Cervical back: Normal range of motion. Skin:     General: Skin is warm and dry. Neurological:      Mental Status: He is alert. Mental status is at baseline. Psychiatric:         Behavior: Behavior normal.          On this date 7/14/2023 I have spent 35 minutes reviewing previous notes, test results and face to face with the patient discussing the diagnosis and importance of compliance with the treatment plan as well as documenting on the day of the visit. An electronic signature was used to authenticate this note.     --Alek Huffman MD

## 2023-07-18 LAB
SHBG SERPL-SCNC: 15 NMOL/L (ref 11–80)
TESTOST FREE SERPL-MCNC: 94.8 PG/ML (ref 47–244)
TESTOST SERPL-MCNC: 329 NG/DL (ref 220–1000)

## 2023-08-01 RX ORDER — METOPROLOL SUCCINATE 25 MG/1
TABLET, EXTENDED RELEASE ORAL
Qty: 90 TABLET | Refills: 1 | Status: SHIPPED | OUTPATIENT
Start: 2023-08-01

## 2023-09-25 NOTE — PATIENT INSTRUCTIONS
Your provider has ordered testing for further evaluation. An order/prescription has been included in your paper work. To schedule outpatient testing, contact Central Scheduling by calling [facility] ([phone]). Plan:  1. Proceed with cardiac MRI ~ non-ischemic cardiomyopathy   2. Referral to Dr.Lynn Green ~ non-ischemic cardiomyopathy   3. I recommend that the patient continue their currently prescribed medications. Their drug modifiable risk factors appear to be well controlled. I will continue to address the need/dosing of medications in future visits.    4. Follow up in 6 months no